# Patient Record
Sex: FEMALE | Race: WHITE | NOT HISPANIC OR LATINO | Employment: OTHER | URBAN - METROPOLITAN AREA
[De-identification: names, ages, dates, MRNs, and addresses within clinical notes are randomized per-mention and may not be internally consistent; named-entity substitution may affect disease eponyms.]

---

## 2019-02-22 ENCOUNTER — HOSPITAL ENCOUNTER (OUTPATIENT)
Dept: OTHER | Facility: HOSPITAL | Age: 74
Discharge: HOME | End: 2019-02-22

## 2019-02-28 ENCOUNTER — HOSPITAL ENCOUNTER (OUTPATIENT)
Dept: OTHER | Facility: HOSPITAL | Age: 74
Discharge: HOME | End: 2019-02-28

## 2019-12-08 ENCOUNTER — HOSPITAL ENCOUNTER (OUTPATIENT)
Dept: URGENT CARE | Facility: CLINIC | Age: 74
Discharge: HOME | End: 2019-12-08

## 2020-03-10 ENCOUNTER — HOSPITAL ENCOUNTER (OUTPATIENT)
Dept: OTHER | Facility: HOSPITAL | Age: 75
Discharge: HOME | End: 2020-03-10

## 2021-03-16 ENCOUNTER — HOSPITAL ENCOUNTER (OUTPATIENT)
Dept: OTHER | Facility: HOSPITAL | Age: 76
Discharge: HOME | End: 2021-03-16

## 2022-04-13 ENCOUNTER — HOSPITAL ENCOUNTER (OUTPATIENT)
Dept: OTHER | Facility: HOSPITAL | Age: 77
Discharge: HOME | End: 2022-04-13

## 2022-10-21 ENCOUNTER — LAB (OUTPATIENT)
Dept: LAB | Facility: HOSPITAL | Age: 77
End: 2022-10-21

## 2022-10-21 ENCOUNTER — TRANSCRIBE ORDERS (OUTPATIENT)
Dept: LAB | Facility: HOSPITAL | Age: 77
End: 2022-10-21

## 2022-10-21 DIAGNOSIS — E16.1 HYPERINSULINISM: ICD-10-CM

## 2022-10-21 DIAGNOSIS — E55.9 VITAMIN D DEFICIENCY: ICD-10-CM

## 2022-10-21 DIAGNOSIS — E53.8 VITAMIN B12 DEFICIENCY: ICD-10-CM

## 2022-10-21 DIAGNOSIS — R73.03 PREDIABETES: ICD-10-CM

## 2022-10-21 DIAGNOSIS — Z98.84 STATUS POST BARIATRIC SURGERY: ICD-10-CM

## 2022-10-21 DIAGNOSIS — E66.9 OBESITY, UNSPECIFIED CLASSIFICATION, UNSPECIFIED OBESITY TYPE, UNSPECIFIED WHETHER SERIOUS COMORBIDITY PRESENT: ICD-10-CM

## 2022-10-21 DIAGNOSIS — Z91.89 AT RISK FOR DIABETES MELLITUS: ICD-10-CM

## 2022-10-21 DIAGNOSIS — E78.5 HYPERLIPIDEMIA, UNSPECIFIED HYPERLIPIDEMIA TYPE: ICD-10-CM

## 2022-10-21 DIAGNOSIS — Z91.89 AT RISK FOR DIABETES MELLITUS: Primary | ICD-10-CM

## 2022-10-21 LAB
25(OH)D3 SERPL-MCNC: 59.7 NG/ML (ref 30–100)
ALBUMIN SERPL-MCNC: 3.8 G/DL (ref 3.5–5.2)
ALBUMIN/GLOB SERPL: 1.3 G/DL
ALP SERPL-CCNC: 57 U/L (ref 39–117)
ALT SERPL W P-5'-P-CCNC: 15 U/L (ref 1–33)
ANION GAP SERPL CALCULATED.3IONS-SCNC: 8.6 MMOL/L (ref 5–15)
AST SERPL-CCNC: 17 U/L (ref 1–32)
BASOPHILS # BLD AUTO: 0.05 10*3/MM3 (ref 0–0.2)
BASOPHILS NFR BLD AUTO: 0.7 % (ref 0–1.5)
BILIRUB SERPL-MCNC: 0.4 MG/DL (ref 0–1.2)
BUN SERPL-MCNC: 22 MG/DL (ref 8–23)
BUN/CREAT SERPL: 30.1 (ref 7–25)
CALCIUM SPEC-SCNC: 9.7 MG/DL (ref 8.6–10.5)
CHLORIDE SERPL-SCNC: 105 MMOL/L (ref 98–107)
CHOLEST SERPL-MCNC: 165 MG/DL (ref 0–200)
CO2 SERPL-SCNC: 27.4 MMOL/L (ref 22–29)
CREAT SERPL-MCNC: 0.73 MG/DL (ref 0.57–1)
DEPRECATED RDW RBC AUTO: 41.2 FL (ref 37–54)
EGFRCR SERPLBLD CKD-EPI 2021: 85.9 ML/MIN/1.73
EOSINOPHIL # BLD AUTO: 0.21 10*3/MM3 (ref 0–0.4)
EOSINOPHIL NFR BLD AUTO: 2.9 % (ref 0.3–6.2)
ERYTHROCYTE [DISTWIDTH] IN BLOOD BY AUTOMATED COUNT: 13 % (ref 12.3–15.4)
FERRITIN SERPL-MCNC: 118 NG/ML (ref 13–150)
FOLATE SERPL-MCNC: >20 NG/ML (ref 4.78–24.2)
GLOBULIN UR ELPH-MCNC: 3 GM/DL
GLUCOSE SERPL-MCNC: 96 MG/DL (ref 65–99)
HBA1C MFR BLD: 5.8 % (ref 4.8–5.6)
HCT VFR BLD AUTO: 42 % (ref 34–46.6)
HDLC SERPL-MCNC: 47 MG/DL (ref 40–60)
HGB BLD-MCNC: 14.1 G/DL (ref 12–15.9)
IMM GRANULOCYTES # BLD AUTO: 0.02 10*3/MM3 (ref 0–0.05)
IMM GRANULOCYTES NFR BLD AUTO: 0.3 % (ref 0–0.5)
IRON 24H UR-MRATE: 104 MCG/DL (ref 37–145)
IRON SATN MFR SERPL: 27 % (ref 20–50)
LDLC SERPL CALC-MCNC: 95 MG/DL (ref 0–100)
LDLC/HDLC SERPL: 1.96 {RATIO}
LYMPHOCYTES # BLD AUTO: 2.73 10*3/MM3 (ref 0.7–3.1)
LYMPHOCYTES NFR BLD AUTO: 37.6 % (ref 19.6–45.3)
MAGNESIUM SERPL-MCNC: 2.1 MG/DL (ref 1.6–2.4)
MCH RBC QN AUTO: 29.3 PG (ref 26.6–33)
MCHC RBC AUTO-ENTMCNC: 33.6 G/DL (ref 31.5–35.7)
MCV RBC AUTO: 87.3 FL (ref 79–97)
MONOCYTES # BLD AUTO: 0.42 10*3/MM3 (ref 0.1–0.9)
MONOCYTES NFR BLD AUTO: 5.8 % (ref 5–12)
NEUTROPHILS NFR BLD AUTO: 3.84 10*3/MM3 (ref 1.7–7)
NEUTROPHILS NFR BLD AUTO: 52.7 % (ref 42.7–76)
NRBC BLD AUTO-RTO: 0 /100 WBC (ref 0–0.2)
PLATELET # BLD AUTO: 244 10*3/MM3 (ref 140–450)
PMV BLD AUTO: 10.4 FL (ref 6–12)
POTASSIUM SERPL-SCNC: 4.1 MMOL/L (ref 3.5–5.2)
PREALB SERPL-MCNC: 25.5 MG/DL (ref 20–40)
PROT SERPL-MCNC: 6.8 G/DL (ref 6–8.5)
RBC # BLD AUTO: 4.81 10*6/MM3 (ref 3.77–5.28)
SODIUM SERPL-SCNC: 141 MMOL/L (ref 136–145)
TIBC SERPL-MCNC: 384 MCG/DL (ref 298–536)
TRANSFERRIN SERPL-MCNC: 258 MG/DL (ref 200–360)
TRIGL SERPL-MCNC: 130 MG/DL (ref 0–150)
VIT B12 BLD-MCNC: 696 PG/ML (ref 211–946)
VLDLC SERPL-MCNC: 23 MG/DL (ref 5–40)
WBC NRBC COR # BLD: 7.27 10*3/MM3 (ref 3.4–10.8)

## 2022-10-21 PROCEDURE — 82728 ASSAY OF FERRITIN: CPT

## 2022-10-21 PROCEDURE — 82306 VITAMIN D 25 HYDROXY: CPT

## 2022-10-21 PROCEDURE — 83540 ASSAY OF IRON: CPT

## 2022-10-21 PROCEDURE — 85025 COMPLETE CBC W/AUTO DIFF WBC: CPT

## 2022-10-21 PROCEDURE — 80053 COMPREHEN METABOLIC PANEL: CPT

## 2022-10-21 PROCEDURE — 82607 VITAMIN B-12: CPT

## 2022-10-21 PROCEDURE — 83735 ASSAY OF MAGNESIUM: CPT

## 2022-10-21 PROCEDURE — 84425 ASSAY OF VITAMIN B-1: CPT

## 2022-10-21 PROCEDURE — 83036 HEMOGLOBIN GLYCOSYLATED A1C: CPT

## 2022-10-21 PROCEDURE — 80061 LIPID PANEL: CPT

## 2022-10-21 PROCEDURE — 84134 ASSAY OF PREALBUMIN: CPT

## 2022-10-21 PROCEDURE — 82746 ASSAY OF FOLIC ACID SERUM: CPT

## 2022-10-21 PROCEDURE — 36415 COLL VENOUS BLD VENIPUNCTURE: CPT

## 2022-10-21 PROCEDURE — 84466 ASSAY OF TRANSFERRIN: CPT

## 2022-10-26 LAB — VIT B1 BLD-SCNC: 194.9 NMOL/L (ref 66.5–200)

## 2023-04-14 ENCOUNTER — HOSPITAL ENCOUNTER (OUTPATIENT)
Dept: OTHER | Facility: HOSPITAL | Age: 78
Discharge: HOME | End: 2023-04-14

## 2023-07-11 ENCOUNTER — TELEPHONE (OUTPATIENT)
Dept: FAMILY MEDICINE CLINIC | Facility: CLINIC | Age: 78
End: 2023-07-11

## 2023-07-11 NOTE — TELEPHONE ENCOUNTER
Caller: Gauri Pan    Relationship: Self    Best call back number: [phone]    What is the best time to reach you: ANY     Who are you requesting to speak with (clinical staff, provider,  specific staff member): CLINICAL     Do you know the name of the person who called: GAURI     What was the call regarding: PATIENT IS RETURNING PHONE CALL TO OFFICE REGARDING REFERRAL. PATIENT IS NOT SURE WHAT REFERRAL OFFICE WAS CALLING ABOUT AND WOULD LIKE A CALL BACK.     PLEASE ADVISE     Is it okay if the provider responds through MyChart: NO

## 2023-08-21 ENCOUNTER — HOSPITAL ENCOUNTER (OUTPATIENT)
Dept: BONE DENSITY | Facility: HOSPITAL | Age: 78
Discharge: HOME | End: 2023-08-21
Payer: MEDICARE

## 2023-08-21 DIAGNOSIS — Z78.0 POSTMENOPAUSAL: ICD-10-CM

## 2023-08-21 PROCEDURE — 77080 DXA BONE DENSITY AXIAL: CPT

## 2023-08-28 ENCOUNTER — TELEPHONE (OUTPATIENT)
Dept: FAMILY MEDICINE CLINIC | Facility: CLINIC | Age: 78
End: 2023-08-28

## 2023-08-28 NOTE — TELEPHONE ENCOUNTER
We can accept the patient, however she does need to go ahead and schedule an appointment to establish her office policy.  Patient is due for her annual wellness visit, she could schedule that if she would like

## 2023-08-28 NOTE — TELEPHONE ENCOUNTER
Provider: JUANITO DOHERTY     Caller: GAURI PEREIRA     Relationship to Patient: SELF     Phone Number: [phone]    Reason for Call: WANTS TO MAKE SURE SHE CAN CHANGE PROVIDER TO ASHLEY BEEBE WHEN CHAS SOLOMON LEAVES, DOES NOT WANT AN APPOINTMENT JUST WANTS TO BE ABLE TO CALL AND HAVE HER ASSIGNED

## 2023-08-28 NOTE — TELEPHONE ENCOUNTER
LMTCB to schedule new pt appt w/Chanel to establish care.    *Ok for HUB to read and schedule next available*

## 2023-09-27 ENCOUNTER — OFFICE VISIT (OUTPATIENT)
Dept: FAMILY MEDICINE CLINIC | Facility: CLINIC | Age: 78
End: 2023-09-27
Payer: MEDICARE

## 2023-09-27 VITALS
HEART RATE: 77 BPM | SYSTOLIC BLOOD PRESSURE: 139 MMHG | BODY MASS INDEX: 39.44 KG/M2 | DIASTOLIC BLOOD PRESSURE: 72 MMHG | HEIGHT: 63 IN | OXYGEN SATURATION: 96 % | WEIGHT: 222.6 LBS

## 2023-09-27 DIAGNOSIS — H26.9 CATARACT, UNSPECIFIED CATARACT TYPE, UNSPECIFIED LATERALITY: ICD-10-CM

## 2023-09-27 DIAGNOSIS — R03.0 ELEVATED BLOOD PRESSURE READING: ICD-10-CM

## 2023-09-27 DIAGNOSIS — Z13.29 THYROID DISORDER SCREEN: ICD-10-CM

## 2023-09-27 DIAGNOSIS — G47.30 SLEEP APNEA, UNSPECIFIED TYPE: ICD-10-CM

## 2023-09-27 DIAGNOSIS — R73.03 PREDIABETES: ICD-10-CM

## 2023-09-27 DIAGNOSIS — E78.5 HYPERLIPIDEMIA, UNSPECIFIED HYPERLIPIDEMIA TYPE: ICD-10-CM

## 2023-09-27 DIAGNOSIS — Z00.00 MEDICARE ANNUAL WELLNESS VISIT, SUBSEQUENT: Primary | ICD-10-CM

## 2023-09-27 DIAGNOSIS — Z23 NEED FOR INFLUENZA VACCINATION: ICD-10-CM

## 2023-09-27 PROCEDURE — 1160F RVW MEDS BY RX/DR IN RCRD: CPT | Performed by: NURSE PRACTITIONER

## 2023-09-27 PROCEDURE — 1170F FXNL STATUS ASSESSED: CPT | Performed by: NURSE PRACTITIONER

## 2023-09-27 PROCEDURE — 1159F MED LIST DOCD IN RCRD: CPT | Performed by: NURSE PRACTITIONER

## 2023-09-27 PROCEDURE — G0008 ADMIN INFLUENZA VIRUS VAC: HCPCS | Performed by: NURSE PRACTITIONER

## 2023-09-27 PROCEDURE — 90662 IIV NO PRSV INCREASED AG IM: CPT | Performed by: NURSE PRACTITIONER

## 2023-09-27 PROCEDURE — G0439 PPPS, SUBSEQ VISIT: HCPCS | Performed by: NURSE PRACTITIONER

## 2023-09-27 NOTE — PROGRESS NOTES
The ABCs of the Annual Wellness Visit  Subsequent Medicare Wellness Visit    Subjective    Leida Pan is a 78 y.o. female who presents for a Subsequent Medicare Wellness Visit.    The following portions of the patient's history were reviewed and   updated as appropriate: allergies, current medications, past family history, past medical history, past social history, past surgical history, and problem list.    Compared to one year ago, the patient feels her physical   health is the same.    Compared to one year ago, the patient feels her mental   health is the same.    Recent Hospitalizations:  She was not admitted to the hospital during the last year.       Current Medical Providers:  Patient Care Team:  Chanel Bunch APRN as PCP - General (Nurse Practitioner)    Outpatient Medications Prior to Visit   Medication Sig Dispense Refill    atorvastatin (LIPITOR) 10 MG tablet Take 1 tablet by mouth Daily.      calcium acetate (PHOS BINDER,) 667 MG capsule capsule Take 2 capsules by mouth 3 (Three) Times a Day.      Cobalamin Combinations (B-12) 100-5000 MCG sublingual tablet Place  under the tongue. 1 A WEEK      iron polysacch complex-B12-FA (FERREX 150 FORTE) 150-0.025-1 MG capsule capsule Take  by mouth Daily.      multivitamin with minerals tablet tablet Take 1 tablet by mouth Daily.      triamcinolone (KENALOG) 0.1 % ointment Apply  topically to the appropriate area as directed.      metFORMIN (GLUCOPHAGE) 500 MG tablet 1 tablet 2 (Two) Times a Day With Meals.       No facility-administered medications prior to visit.       No opioid medication identified on active medication list. I have reviewed chart for other potential  high risk medication/s and harmful drug interactions in the elderly.        Aspirin is not on active medication list.  Aspirin use is not indicated based on review of current medical condition/s. Risk of harm outweighs potential benefits.  .    Patient Active Problem List   Diagnosis     "Elevated blood pressure reading    Prediabetes    Hyperlipidemia    Sleep apnea     Advance Care Planning   Advance Care Planning     Advance Directive is not on file.  ACP discussion was held with the patient during this visit. Patient has an advance directive (not in EMR), copy requested.     Objective    Vitals:    23 1105   BP: 139/72   Pulse: 77   SpO2: 96%   Weight: 101 kg (222 lb 9.6 oz)   Height: 160 cm (63\")   PainSc:   2   PainLoc: Hip  Comment: left     Estimated body mass index is 39.43 kg/m² as calculated from the following:    Height as of this encounter: 160 cm (63\").    Weight as of this encounter: 101 kg (222 lb 9.6 oz).           Does the patient have evidence of cognitive impairment? No          HEALTH RISK ASSESSMENT    Smoking Status:  Social History     Tobacco Use   Smoking Status Not on file   Smokeless Tobacco Not on file     Alcohol Consumption:  Social History     Substance and Sexual Activity   Alcohol Use Not Currently     Fall Risk Screen:    STEADI Fall Risk Assessment was completed, and patient is at LOW risk for falls.Assessment completed on:2023    Depression Screenin/27/2023    11:03 AM   PHQ-2/PHQ-9 Depression Screening   Little Interest or Pleasure in Doing Things 0-->not at all   Feeling Down, Depressed or Hopeless 0-->not at all   PHQ-9: Brief Depression Severity Measure Score 0       Health Habits and Functional and Cognitive Screenin/27/2023    11:00 AM   Functional & Cognitive Status   Do you have difficulty preparing food and eating? No   Do you have difficulty bathing yourself, getting dressed or grooming yourself? No   Do you have difficulty using the toilet? No   Do you have difficulty moving around from place to place? No   Do you have trouble with steps or getting out of a bed or a chair? No   Current Diet Low Fat Diet   Dental Exam Up to date   Eye Exam Not up to date   Exercise (times per week) 7 times per week   Current Exercises Include " Walking   Do you need help using the phone?  No   Are you deaf or do you have serious difficulty hearing?  No   Do you need help to go to places out of walking distance? No   Do you need help shopping? No   Do you need help preparing meals?  No   Do you need help with housework?  No   Do you need help with laundry? No   Do you need help taking your medications? No   Do you need help managing money? No   Do you ever drive or ride in a car without wearing a seat belt? No   Have you felt unusual stress, anger or loneliness in the last month? No   Who do you live with? Alone   If you need help, do you have trouble finding someone available to you? No   Have you been bothered in the last four weeks by sexual problems? No   Do you have difficulty concentrating, remembering or making decisions? No       Age-appropriate Screening Schedule:  Refer to the list below for future screening recommendations based on patient's age, sex and/or medical conditions. Orders for these recommended tests are listed in the plan section. The patient has been provided with a written plan.    Health Maintenance   Topic Date Due    TDAP/TD VACCINES (1 - Tdap) Never done    Pneumococcal Vaccine 65+ (1 - PCV) Never done    ZOSTER VACCINE (2 of 2) 11/09/2020    HEPATITIS C SCREENING  Never done    ANNUAL WELLNESS VISIT  Never done    COVID-19 Vaccine (6 - 2023-24 season) 09/01/2023    INFLUENZA VACCINE  10/01/2023    LIPID PANEL  10/21/2023    BMI FOLLOWUP  07/07/2024    DXA SCAN  08/21/2025    COLORECTAL CANCER SCREENING  Discontinued                  CMS Preventative Services Quick Reference  Risk Factors Identified During Encounter  None Identified  The above risks/problems have been discussed with the patient.  Pertinent information has been shared with the patient in the After Visit Summary.  An After Visit Summary and PPPS were made available to the patient.    Follow Up:   Next Medicare Wellness visit to be scheduled in 1 year.  "      Additional E&M Note during same encounter follows:  Patient has multiple medical problems which are significant and separately identifiable that require additional work above and beyond the Medicare Wellness Visit.      Chief Complaint  Establish Care    Subjective        HPI  Leida Pan is also being seen today for     History of Present Illness  Pt here to [facility], previous pcp: judah lopez.    Pt states she had shingles and  pneumonia vaccines while living in [place]     Pt needs refill on metformin.    Pt had a stomach sleeve in 2018 and wants PCP to look at blood work from her doctor in her [place].     Pt would like new eye doctor for her cataract.     Pt wants to discuss new doctor for sleep apnea and her cpap machine.          Objective   Vital Signs:  /72   Pulse 77   Ht 160 cm (63\")   Wt 101 kg (222 lb 9.6 oz)   SpO2 96%   BMI 39.43 kg/m²     Physical Exam  Vitals reviewed.   Constitutional:       Appearance: Normal appearance. She is well-developed.   HENT:      Head: Normocephalic and atraumatic.      Right Ear: External ear normal.      Left Ear: External ear normal.   Eyes:      Conjunctiva/sclera: Conjunctivae normal.      Pupils: Pupils are equal, round, and reactive to light.   Cardiovascular:      Rate and Rhythm: Normal rate and regular rhythm.      Heart sounds: No murmur heard.    No friction rub. No gallop.   Pulmonary:      Effort: Pulmonary effort is normal.      Breath sounds: Normal breath sounds. No wheezing or rhonchi.   Skin:     General: Skin is warm and dry.   Neurological:      Mental Status: She is alert and oriented to person, place, and time.      Cranial Nerves: No cranial nerve deficit.   Psychiatric:         Mood and Affect: Mood and affect normal.         Behavior: Behavior normal.         Thought Content: Thought content normal.         Judgment: Judgment normal.        The following data was reviewed by: TOI Lemus on 09/27/2023:  CMP  "         10/21/2022    09:12   CMP   Glucose 96    BUN 22    Creatinine 0.73    EGFR 85.9    Sodium 141    Potassium 4.1    Chloride 105    Calcium 9.7    Total Protein 6.8    Albumin 3.80    Globulin 3.0    Total Bilirubin 0.4    Alkaline Phosphatase 57    AST (SGOT) 17    ALT (SGPT) 15    Albumin/Globulin Ratio 1.3    BUN/Creatinine Ratio 30.1    Anion Gap 8.6      CBC          10/21/2022    09:12   CBC   WBC 7.27    RBC 4.81    Hemoglobin 14.1    Hematocrit 42.0    MCV 87.3    MCH 29.3    MCHC 33.6    RDW 13.0    Platelets 244      Lipid Panel          10/21/2022    09:12   Lipid Panel   Total Cholesterol 165    Triglycerides 130    HDL Cholesterol 47    VLDL Cholesterol 23    LDL Cholesterol  95    LDL/HDL Ratio 1.96        Most Recent A1C          10/21/2022    09:12   HGBA1C Most Recent   Hemoglobin A1C 5.80                 Assessment and Plan   Diagnoses and all orders for this visit:    1. Medicare annual wellness visit, subsequent (Primary)    2. Prediabetes  Assessment & Plan:  Stable and well-controlled with metformin, continue current dose    Orders:  -     metFORMIN (GLUCOPHAGE) 500 MG tablet; Take 1 tablet by mouth 2 (Two) Times a Day With Meals.  Dispense: 180 tablet; Refill: 1  -     Comprehensive Metabolic Panel; Future  -     CBC & Differential; Future  -     Hemoglobin A1c; Future    3. Elevated blood pressure reading  Assessment & Plan:  Blood pressure noted to be elevated today, patient will monitor at home, follow-up if remaining elevated, discussed goal 130/80 or less.      4. Thyroid disorder screen  -     TSH Rfx On Abnormal To Free T4; Future    5. Hyperlipidemia, unspecified hyperlipidemia type  -     Comprehensive Metabolic Panel; Future  -     CBC & Differential; Future  -     Lipid Panel; Future    6. Cataract, unspecified cataract type, unspecified laterality  -     Ambulatory Referral to Ophthalmology    7. Sleep apnea, unspecified type  -     Ambulatory Referral to Sleep  Medicine    8. Need for influenza vaccination  -     Fluzone High-Dose 65+yrs (5928-4538)             Follow Up   Return in about 3 months (around 12/27/2023), or if symptoms worsen or fail to improve.  Patient was given instructions and counseling regarding her condition or for health maintenance advice. Please see specific information pulled into the AVS if appropriate.

## 2023-09-27 NOTE — PROGRESS NOTES
"Chief Complaint  Establish Care    SUBJECTIVE  Leida Pan presents to [facility] ***    History of Present Illness  Pt here to [facility], previous pcp: judah lpoez.    Pt needs refill on metformin.    Pt had a stomach sleeve in 2018 and wants PCP to look at blood work from her doctor in her [place].     Pt would like new eye doctor for her cataract.     Pt wants to discuss new doctor for sleep apnea and her cpap machine.     Pt states she has L hip pain when she first gets up in the AM.     Pt is due for vaccines and is aware. Pt would like to talk about the RSV and flu vaccines.   Past Medical History:   Diagnosis Date    Arthritis     Cataract     History of medical problems       Family History   Problem Relation Age of Onset    Cancer Mother     Heart disease Mother     Heart disease Father       Past Surgical History:   Procedure Laterality Date    BARIATRIC SURGERY  2019        Current Outpatient Medications:     atorvastatin (LIPITOR) 10 MG tablet, Take 1 tablet by mouth Daily., Disp: , Rfl:     calcium acetate (PHOS BINDER,) 667 MG capsule capsule, Take 2 capsules by mouth 3 (Three) Times a Day., Disp: , Rfl:     Cobalamin Combinations (B-12) 100-5000 MCG sublingual tablet, Place  under the tongue. 1 A WEEK, Disp: , Rfl:     iron polysacch complex-B12-FA (FERREX 150 FORTE) 150-0.025-1 MG capsule capsule, Take  by mouth Daily., Disp: , Rfl:     metFORMIN (GLUCOPHAGE) 500 MG tablet, 1 tablet 2 (Two) Times a Day With Meals., Disp: , Rfl:     multivitamin with minerals tablet tablet, Take 1 tablet by mouth Daily., Disp: , Rfl:     triamcinolone (KENALOG) 0.1 % ointment, Apply  topically to the appropriate area as directed., Disp: , Rfl:     OBJECTIVE  Vital Signs:   /72   Pulse 77   Ht 160 cm (63\")   Wt 101 kg (222 lb 9.6 oz)   SpO2 96%   BMI 39.43 kg/m²    Estimated body mass index is 39.43 kg/m² as calculated from the following:    Height as of this encounter: " "160 cm (63\").    Weight as of this encounter: 101 kg (222 lb 9.6 oz).     Wt Readings from Last 3 Encounters:   09/27/23 101 kg (222 lb 9.6 oz)   07/07/23 102 kg (225 lb)     BP Readings from Last 3 Encounters:   09/27/23 139/72   07/07/23 140/83       Physical Exam     Result Review    {[facility] Ambulatory Labs (Optional):41196}    No Images in the past 120 days found..     The above data has been reviewed by TOI Lemus 09/27/2023 10:51 EDT.          Patient Care Team:  Chanel Bunch APRN as PCP - General (Nurse Practitioner)            ASSESSMENT & PLAN    Diagnoses and all orders for this visit:    1. Prediabetes (Primary)         Tobacco Use: Not on file       Follow Up     No follow-ups on file.    {Time Spent (Optional):62872}    Patient was given instructions and counseling regarding her condition or for health maintenance advice. Please see specific information pulled into the AVS if appropriate.   I have reviewed information obtained and documented by others and I have confirmed the accuracy of this documented note.    TOI Lemus          "

## 2023-09-28 PROBLEM — E78.5 HYPERLIPIDEMIA: Status: ACTIVE | Noted: 2023-09-28

## 2023-09-28 PROBLEM — G47.30 SLEEP APNEA: Status: ACTIVE | Noted: 2023-09-28

## 2023-09-28 NOTE — ASSESSMENT & PLAN NOTE
Blood pressure noted to be elevated today, patient will monitor at home, follow-up if remaining elevated, discussed goal 130/80 or less.

## 2023-10-23 ENCOUNTER — OFFICE VISIT (OUTPATIENT)
Dept: SLEEP MEDICINE | Facility: HOSPITAL | Age: 78
End: 2023-10-23
Payer: MEDICARE

## 2023-10-23 VITALS
HEART RATE: 80 BPM | OXYGEN SATURATION: 94 % | HEIGHT: 63 IN | BODY MASS INDEX: 39.46 KG/M2 | DIASTOLIC BLOOD PRESSURE: 75 MMHG | WEIGHT: 222.7 LBS | SYSTOLIC BLOOD PRESSURE: 133 MMHG

## 2023-10-23 DIAGNOSIS — G47.33 OBSTRUCTIVE SLEEP APNEA, ADULT: Primary | ICD-10-CM

## 2023-10-23 DIAGNOSIS — E66.09 CLASS 2 OBESITY DUE TO EXCESS CALORIES WITH BODY MASS INDEX (BMI) OF 39.0 TO 39.9 IN ADULT, UNSPECIFIED WHETHER SERIOUS COMORBIDITY PRESENT: ICD-10-CM

## 2023-10-23 PROCEDURE — 1160F RVW MEDS BY RX/DR IN RCRD: CPT | Performed by: NURSE PRACTITIONER

## 2023-10-23 PROCEDURE — 99203 OFFICE O/P NEW LOW 30 MIN: CPT | Performed by: NURSE PRACTITIONER

## 2023-10-23 PROCEDURE — G0463 HOSPITAL OUTPT CLINIC VISIT: HCPCS

## 2023-10-23 PROCEDURE — 1159F MED LIST DOCD IN RCRD: CPT | Performed by: NURSE PRACTITIONER

## 2023-10-23 NOTE — PROGRESS NOTES
"  [address]  [facility], [place] [address]  Phone: [phone]  Fax: [phone]      Leida Pan  [id number]   1945  78 y.o.  female      PCP:Chanel Bunch APRN    Type of service: Initial New Patient Office Visit  Date of service: 10/23/2023          CHIEF COMPLAINT: Obstructive sleep apnea      HISTORY OF PRESENT ILLNESS:  Leida Pan 78 y.o.  presents to the clinic today as a new patient to me and was seen to establish care for treatment and management of obstructive sleep apnea.  She previously had a sleep study in [facility].  I do not have records of this to review, have requested a copy.  She presents today to establish care.  We were able to get a download from her ResMed AirSense 10 auto CPAP to review.  Her minimum pressure is at 6, maximum pressure 15, EPR is off.  She has 100% usage for the last 30 days, always more than 4 hours.  Her median pressure is 11.6, 95th percentile pressure 13.7, and maximum pressure 14.5.  Her AHI is 0.5/h.  Reports she got this current CPAP about 3 years ago after the Yonathan recall.   She is doing great with current CPAP, loves CPAP, reports it is her \"best friend\", changed her life.  She moved to [facility] with her son and daughter-in-law to be closer to them and her grandkids.  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  She has a grandson in [employer] school.        PATIENT HISTORY:  Sleep schedule:  Bedtime: 1030 to 11 PM  Wake time: 7 to 8 AM  Normally takes 10 minutes to fall asleep  Number of naps per day: 0      Past medical history: (Relevant to sleep medicine)  Prediabetes  Hyperlipidemia      Social history:  Do you drive a commercial vehicle:  No   Shift work:  No   Tobacco use:  No  Alcohol use:  0 per week  Caffeinated drinks: 0 per day      Family history (parents, siblings, children) (relevant to sleep " "medicine):  No relevant    Medications: reviewed     ALLERGIES: Erythromycin base        REVIEW OF SYSTEMS:  Full review of systems available on the intake form which is scanned in the media tab.  The relevant positives are noted below:  [place] Sleepiness Scale: Total score: 1   Occasional dry mouth          PHYSICAL EXAM:  Vitals:    10/23/23 1100   BP: 133/75   Pulse: 80   SpO2: 94%   Weight: 101 kg (222 lb 11.2 oz)   Height: 160 cm (63\")    Body mass index is 39.45 kg/m². Neck Circumference: 16 inches  HEAD: atraumatic, normocephalic  NECK: Neck Circumference: 16 inches, trachea is in the midline  RESPIRATORY SYSTEM: Respirations even, unlabored, normal rate  CARDIOVASULAR SYSTEM: Normal rate  EXTREMITES: No cyanosis or clubbing  NEUROLOGICAL SYSTEM: Alert and oriented x 3, no gross motor defects, gait normal    Office notes from care team reviewed. Office note(s) dated 9/27/2023, reviewed.      Labs/ Test Results Reviewed:   Reviewed 4/2023 BMP, magnesium level           DATA REVIEWED:   The Smart card downloaded on 10/23/2023 has been reviewed independently by me for compliance and discussed the data with the patient.   Compliance: 100%  More than 4 hr use: 100%  Average use of the device: 9 hours 16 minutes per night  Residual AHI: 0.5 /hr (goal < 5.0 /hr)  Mask type: Over nose  Device: ResMed air sense 10  DME: Was using [facility] care in [facility], will change to [facility]            ASSESSMENT AND PLAN:   Obstructive Sleep Apnea (G 47.33): sleep apnea symptoms have improved with the device and treatment.  Patient has excellent compliance with the device for treatment of sleep apnea.  I have personally reviewed the smart card download and discussed the download data with the patient and encouarged continued use of the device.  The residual AHI is acceptable. The device is benefiting the patient and the device is medically necessary.  Without adequate treatment of sleep apnea and without good " compliance, patient would be at increased risk of hypertension, diabetes mellitus, pulmonary hypertension, atrial fibrillation, stroke, and nonrestorative sleep with hypersomnia which could increase risk of motor vehicle accidents. The patient is also instructed to get the supplies from the DME company and and change them on a regular basis.  A prescription for supplies has been sent to the DME company.  I have also discussed with this patient the importance of good sleep hygiene and getting an adequate amount of sleep to aid in overall good health. Will change DME to [facility].  Requested copy of previous sleep study, will addend note once received.  ADDENDUM: Received previous sleep study from 8/27/2012 showing average AHI of 7.1/h.    Obesity: Body mass index is 39.45 kg/m².. Patients who are overweight or obese are at increased risk of sleep apnea/ sleep disordered breathing. Weight reduction and healthy lifestyle are encouraged in overweight/ obese patients as part of a comprehensive approach to sleep apnea treatment.    Prediabetes  Hyperlipidemia  History of gastric bypass    Patient will follow-up in 6 months or sooner for issues or concerns.  Patient's questions were answered.          Thank you for allowing me to participate in the care of this patient.    Charline Ashton DNP, APRN  [facility]

## 2023-10-25 ENCOUNTER — LAB (OUTPATIENT)
Dept: LAB | Facility: HOSPITAL | Age: 78
End: 2023-10-25
Payer: MEDICARE

## 2023-10-25 DIAGNOSIS — R73.03 PREDIABETES: ICD-10-CM

## 2023-10-25 DIAGNOSIS — R73.09 ELEVATED GLUCOSE: ICD-10-CM

## 2023-10-25 DIAGNOSIS — I10 PRIMARY HYPERTENSION: ICD-10-CM

## 2023-10-25 DIAGNOSIS — Z13.6 ENCOUNTER FOR LIPID SCREENING FOR CARDIOVASCULAR DISEASE: ICD-10-CM

## 2023-10-25 DIAGNOSIS — Z13.29 SCREENING FOR THYROID DISORDER: ICD-10-CM

## 2023-10-25 DIAGNOSIS — E78.5 HYPERLIPIDEMIA, UNSPECIFIED HYPERLIPIDEMIA TYPE: ICD-10-CM

## 2023-10-25 DIAGNOSIS — Z98.84 H/O GASTRIC BYPASS: ICD-10-CM

## 2023-10-25 DIAGNOSIS — Z13.220 ENCOUNTER FOR LIPID SCREENING FOR CARDIOVASCULAR DISEASE: ICD-10-CM

## 2023-10-25 DIAGNOSIS — Z13.29 THYROID DISORDER SCREEN: ICD-10-CM

## 2023-10-25 LAB
ALBUMIN SERPL-MCNC: 4 G/DL (ref 3.5–5.2)
ALBUMIN/GLOB SERPL: 1.3 G/DL
ALP SERPL-CCNC: 61 U/L (ref 39–117)
ALT SERPL W P-5'-P-CCNC: 17 U/L (ref 1–33)
ANION GAP SERPL CALCULATED.3IONS-SCNC: 9 MMOL/L (ref 5–15)
AST SERPL-CCNC: 21 U/L (ref 1–32)
BASOPHILS # BLD AUTO: 0.06 10*3/MM3 (ref 0–0.2)
BASOPHILS NFR BLD AUTO: 0.8 % (ref 0–1.5)
BILIRUB SERPL-MCNC: 0.4 MG/DL (ref 0–1.2)
BUN SERPL-MCNC: 23 MG/DL (ref 8–23)
BUN/CREAT SERPL: 29.5 (ref 7–25)
CALCIUM SPEC-SCNC: 9.6 MG/DL (ref 8.6–10.5)
CHLORIDE SERPL-SCNC: 108 MMOL/L (ref 98–107)
CHOLEST SERPL-MCNC: 152 MG/DL (ref 0–200)
CO2 SERPL-SCNC: 25 MMOL/L (ref 22–29)
CREAT SERPL-MCNC: 0.78 MG/DL (ref 0.57–1)
DEPRECATED RDW RBC AUTO: 41 FL (ref 37–54)
EGFRCR SERPLBLD CKD-EPI 2021: 78.8 ML/MIN/1.73
EOSINOPHIL # BLD AUTO: 0.29 10*3/MM3 (ref 0–0.4)
EOSINOPHIL NFR BLD AUTO: 3.9 % (ref 0.3–6.2)
ERYTHROCYTE [DISTWIDTH] IN BLOOD BY AUTOMATED COUNT: 13 % (ref 12.3–15.4)
FOLATE SERPL-MCNC: >20 NG/ML (ref 4.78–24.2)
GLOBULIN UR ELPH-MCNC: 3.2 GM/DL
GLUCOSE SERPL-MCNC: 94 MG/DL (ref 65–99)
HBA1C MFR BLD: 5.7 % (ref 4.8–5.6)
HCT VFR BLD AUTO: 43.2 % (ref 34–46.6)
HDLC SERPL-MCNC: 45 MG/DL (ref 40–60)
HGB BLD-MCNC: 14.4 G/DL (ref 12–15.9)
IMM GRANULOCYTES # BLD AUTO: 0.01 10*3/MM3 (ref 0–0.05)
IMM GRANULOCYTES NFR BLD AUTO: 0.1 % (ref 0–0.5)
LDLC SERPL CALC-MCNC: 89 MG/DL (ref 0–100)
LDLC/HDLC SERPL: 1.95 {RATIO}
LYMPHOCYTES # BLD AUTO: 2.94 10*3/MM3 (ref 0.7–3.1)
LYMPHOCYTES NFR BLD AUTO: 39.8 % (ref 19.6–45.3)
MCH RBC QN AUTO: 29 PG (ref 26.6–33)
MCHC RBC AUTO-ENTMCNC: 33.3 G/DL (ref 31.5–35.7)
MCV RBC AUTO: 86.9 FL (ref 79–97)
MONOCYTES # BLD AUTO: 0.42 10*3/MM3 (ref 0.1–0.9)
MONOCYTES NFR BLD AUTO: 5.7 % (ref 5–12)
NEUTROPHILS NFR BLD AUTO: 3.66 10*3/MM3 (ref 1.7–7)
NEUTROPHILS NFR BLD AUTO: 49.7 % (ref 42.7–76)
NRBC BLD AUTO-RTO: 0 /100 WBC (ref 0–0.2)
PLATELET # BLD AUTO: 283 10*3/MM3 (ref 140–450)
PMV BLD AUTO: 10.3 FL (ref 6–12)
POTASSIUM SERPL-SCNC: 4.2 MMOL/L (ref 3.5–5.2)
PROT SERPL-MCNC: 7.2 G/DL (ref 6–8.5)
RBC # BLD AUTO: 4.97 10*6/MM3 (ref 3.77–5.28)
SODIUM SERPL-SCNC: 142 MMOL/L (ref 136–145)
TRIGL SERPL-MCNC: 97 MG/DL (ref 0–150)
TSH SERPL DL<=0.05 MIU/L-ACNC: 3.86 UIU/ML (ref 0.27–4.2)
VIT B12 BLD-MCNC: 604 PG/ML (ref 211–946)
VLDLC SERPL-MCNC: 18 MG/DL (ref 5–40)
WBC NRBC COR # BLD: 7.38 10*3/MM3 (ref 3.4–10.8)

## 2023-10-25 PROCEDURE — 82746 ASSAY OF FOLIC ACID SERUM: CPT

## 2023-10-25 PROCEDURE — 83036 HEMOGLOBIN GLYCOSYLATED A1C: CPT

## 2023-10-25 PROCEDURE — 85025 COMPLETE CBC W/AUTO DIFF WBC: CPT

## 2023-10-25 PROCEDURE — 80053 COMPREHEN METABOLIC PANEL: CPT

## 2023-10-25 PROCEDURE — 36415 COLL VENOUS BLD VENIPUNCTURE: CPT

## 2023-10-25 PROCEDURE — 84443 ASSAY THYROID STIM HORMONE: CPT

## 2023-10-25 PROCEDURE — 80061 LIPID PANEL: CPT

## 2023-10-25 PROCEDURE — 82607 VITAMIN B-12: CPT

## 2024-02-26 ENCOUNTER — TELEPHONE (OUTPATIENT)
Dept: FAMILY MEDICINE CLINIC | Facility: CLINIC | Age: 79
End: 2024-02-26
Payer: MEDICARE

## 2024-02-26 NOTE — TELEPHONE ENCOUNTER
Hello you currently have a referral in your chart for OPHTHALMOLOGY that has not been scheduled. Please call [phone] Charleen to schedule your appointments.    If you are no longer wanting this referral please call our office at [phone].    Thank you.

## 2024-03-18 ENCOUNTER — OFFICE VISIT (OUTPATIENT)
Dept: FAMILY MEDICINE CLINIC | Facility: CLINIC | Age: 79
End: 2024-03-18
Payer: MEDICARE

## 2024-03-18 VITALS
OXYGEN SATURATION: 98 % | BODY MASS INDEX: 40.75 KG/M2 | WEIGHT: 230 LBS | DIASTOLIC BLOOD PRESSURE: 72 MMHG | HEART RATE: 78 BPM | SYSTOLIC BLOOD PRESSURE: 151 MMHG | HEIGHT: 63 IN

## 2024-03-18 DIAGNOSIS — E78.5 HYPERLIPIDEMIA, UNSPECIFIED HYPERLIPIDEMIA TYPE: ICD-10-CM

## 2024-03-18 DIAGNOSIS — I10 HYPERTENSION, UNSPECIFIED TYPE: Primary | ICD-10-CM

## 2024-03-18 DIAGNOSIS — Z13.29 THYROID DISORDER SCREEN: ICD-10-CM

## 2024-03-18 DIAGNOSIS — R73.03 PREDIABETES: ICD-10-CM

## 2024-03-18 DIAGNOSIS — E66.01 CLASS 3 SEVERE OBESITY WITH SERIOUS COMORBIDITY AND BODY MASS INDEX (BMI) OF 40.0 TO 44.9 IN ADULT, UNSPECIFIED OBESITY TYPE: ICD-10-CM

## 2024-03-18 DIAGNOSIS — Z12.31 SCREENING MAMMOGRAM FOR BREAST CANCER: ICD-10-CM

## 2024-03-18 PROBLEM — E66.813 CLASS 3 SEVERE OBESITY WITH SERIOUS COMORBIDITY AND BODY MASS INDEX (BMI) OF 40.0 TO 44.9 IN ADULT: Status: ACTIVE | Noted: 2024-03-18

## 2024-03-18 RX ORDER — LISINOPRIL 10 MG/1
10 TABLET ORAL DAILY
Qty: 30 TABLET | Refills: 1 | Status: SHIPPED | OUTPATIENT
Start: 2024-03-18

## 2024-03-18 RX ORDER — ATORVASTATIN CALCIUM 10 MG/1
10 TABLET, FILM COATED ORAL DAILY
Qty: 90 TABLET | Refills: 1 | Status: SHIPPED | OUTPATIENT
Start: 2024-03-18

## 2024-03-18 NOTE — ASSESSMENT & PLAN NOTE
Blood pressure elevated initially, improved upon manual recheck but remains elevated, discussed with patient that I would like for her to monitor her blood pressure for the next month, we will follow-up at that time and if blood pressure remains elevated we will need to make medication adjustment, patient to work on diet and exercise, discussed that she has had some recent weight gain, patient verbalized understanding, we will follow-up in a month

## 2024-03-18 NOTE — ASSESSMENT & PLAN NOTE
Patient's (Body mass index is 40.74 kg/m².) indicates that they are morbidly/severely obese (BMI > 40 or > 35 with obesity - related health condition) with health conditions that include hypertension and diabetes mellitus . Weight is worsening. BMI  is above average; BMI management plan is completed. We discussed portion control and increasing exercise.

## 2024-03-18 NOTE — PROGRESS NOTES
"Chief Complaint  Diabetes and Hyperlipidemia    SUBJECTIVE  Leida Pan presents to [facility] for 6 month follow up on Diabetes and Hyperlipidemia.    Had a biopsy of a place on nose by dermatology last week, awaiting pathology     History of Present Illness  Past Medical History:   Diagnosis Date    Arthritis     Cataract     History of medical problems       Family History   Problem Relation Age of Onset    Cancer Mother     Heart disease Mother     Heart disease Father       Past Surgical History:   Procedure Laterality Date    BARIATRIC SURGERY  2019        Current Outpatient Medications:     atorvastatin (LIPITOR) 10 MG tablet, Take 1 tablet by mouth Daily., Disp: 90 tablet, Rfl: 1    calcium acetate (PHOS BINDER,) 667 MG capsule capsule, Take 2 capsules by mouth 3 (Three) Times a Day., Disp: , Rfl:     Cobalamin Combinations (B-12) 100-5000 MCG sublingual tablet, Place  under the tongue. 1 A WEEK, Disp: , Rfl:     iron polysacch complex-B12-FA (FERREX 150 FORTE) 150-0.025-1 MG capsule capsule, Take  by mouth Daily., Disp: , Rfl:     metFORMIN (GLUCOPHAGE) 500 MG tablet, Take 1 tablet by mouth 2 (Two) Times a Day With Meals., Disp: 180 tablet, Rfl: 1    multivitamin with minerals tablet tablet, Take 1 tablet by mouth Daily., Disp: , Rfl:     triamcinolone (KENALOG) 0.1 % ointment, Apply  topically to the appropriate area as directed., Disp: , Rfl:     lisinopril (PRINIVIL,ZESTRIL) 10 MG tablet, Take 1 tablet by mouth Daily., Disp: 30 tablet, Rfl: 1    OBJECTIVE  Vital Signs:   /72   Pulse 78   Ht 160 cm (63\")   Wt 104 kg (230 lb)   SpO2 98%   BMI 40.74 kg/m²    Estimated body mass index is 40.74 kg/m² as calculated from the following:    Height as of this encounter: 160 cm (63\").    Weight as of this encounter: 104 kg (230 lb).     Wt Readings from Last 3 Encounters:   03/18/24 104 kg (230 lb)   10/23/23 101 kg (222 lb 11.2 oz)   09/27/23 101 kg (222 lb 9.6 oz) "     BP Readings from Last 3 Encounters:   03/18/24 151/72   10/23/23 133/75   09/27/23 139/72       Physical Exam  Vitals reviewed.   Constitutional:       Appearance: Normal appearance. She is well-developed.   HENT:      Head: Normocephalic and atraumatic.      Right Ear: External ear normal.      Left Ear: External ear normal.   Eyes:      Conjunctiva/sclera: Conjunctivae normal.      Pupils: Pupils are equal, round, and reactive to light.   Cardiovascular:      Rate and Rhythm: Normal rate and regular rhythm.      Heart sounds: No murmur heard.     No friction rub. No gallop.   Pulmonary:      Effort: Pulmonary effort is normal.      Breath sounds: Normal breath sounds. No wheezing or rhonchi.   Skin:     General: Skin is warm and dry.   Neurological:      Mental Status: She is alert and oriented to person, place, and time.      Cranial Nerves: No cranial nerve deficit.   Psychiatric:         Mood and Affect: Mood and affect normal.         Behavior: Behavior normal.         Thought Content: Thought content normal.         Judgment: Judgment normal.          Result Review    CMP          10/25/2023    08:42   CMP   Glucose 94    BUN 23    Creatinine 0.78    EGFR 78.8    Sodium 142    Potassium 4.2    Chloride 108    Calcium 9.6    Total Protein 7.2    Albumin 4.0    Globulin 3.2    Total Bilirubin 0.4    Alkaline Phosphatase 61    AST (SGOT) 21    ALT (SGPT) 17    Albumin/Globulin Ratio 1.3    BUN/Creatinine Ratio 29.5    Anion Gap 9.0      CBC          10/25/2023    08:42   CBC   WBC 7.38    RBC 4.97    Hemoglobin 14.4    Hematocrit 43.2    MCV 86.9    MCH 29.0    MCHC 33.3    RDW 13.0    Platelets 283      Lipid Panel          10/25/2023    08:42   Lipid Panel   Total Cholesterol 152    Triglycerides 97    HDL Cholesterol 45    VLDL Cholesterol 18    LDL Cholesterol  89    LDL/HDL Ratio 1.95      TSH          10/25/2023    08:42   TSH   TSH 3.860      Most Recent A1C          10/25/2023    08:42   HGBA1C Most  Recent   Hemoglobin A1C 5.70        No Images in the past 120 days found..     The above data has been reviewed by TOI Lemus 03/18/2024 08:34 EDT.          Patient Care Team:  Chanel Bunch APRN as PCP - General (Nurse Practitioner)            ASSESSMENT & PLAN    Diagnoses and all orders for this visit:    1. Hypertension, unspecified type (Primary)  Assessment & Plan:  Blood pressure elevated initially, improved upon manual recheck but remains elevated, discussed with patient that I would like for her to monitor her blood pressure for the next month, we will follow-up at that time and if blood pressure remains elevated we will need to make medication adjustment, patient to work on diet and exercise, discussed that she has had some recent weight gain, patient verbalized understanding, we will follow-up in a month    Orders:  -     Comprehensive Metabolic Panel; Future  -     CBC & Differential; Future  -     Lipid Panel; Future  -     lisinopril (PRINIVIL,ZESTRIL) 10 MG tablet; Take 1 tablet by mouth Daily.  Dispense: 30 tablet; Refill: 1    2. Screening mammogram for breast cancer  -     Mammo Screening Digital Tomosynthesis Bilateral With CAD; Future    3. Prediabetes  -     Hemoglobin A1c; Future  -     metFORMIN (GLUCOPHAGE) 500 MG tablet; Take 1 tablet by mouth 2 (Two) Times a Day With Meals.  Dispense: 180 tablet; Refill: 1    4. Class 3 severe obesity with serious comorbidity and body mass index (BMI) of 40.0 to 44.9 in adult, unspecified obesity type  Assessment & Plan:  Patient's (Body mass index is 40.74 kg/m².) indicates that they are morbidly/severely obese (BMI > 40 or > 35 with obesity - related health condition) with health conditions that include hypertension and diabetes mellitus . Weight is worsening. BMI  is above average; BMI management plan is completed. We discussed portion control and increasing exercise.       5. Thyroid disorder screen  -     TSH Rfx On Abnormal To Free  T4; Future    6. Hyperlipidemia, unspecified hyperlipidemia type  Overview:  Stable on atorvastatin, continue current medication    Orders:  -     atorvastatin (LIPITOR) 10 MG tablet; Take 1 tablet by mouth Daily.  Dispense: 90 tablet; Refill: 1         Tobacco Use: Low Risk  (3/18/2024)    Patient History     Smoking Tobacco Use: Never     Smokeless Tobacco Use: Never     Passive Exposure: Not on file       Follow Up     Return in about 4 weeks (around 4/15/2024), or if symptoms worsen or fail to improve.        Patient was given instructions and counseling regarding her condition or for health maintenance advice. Please see specific information pulled into the AVS if appropriate.   I have reviewed information obtained and documented by others and I have confirmed the accuracy of this documented note.    Chanel Bunch APRN

## 2024-04-12 DIAGNOSIS — I10 HYPERTENSION, UNSPECIFIED TYPE: ICD-10-CM

## 2024-04-12 RX ORDER — LISINOPRIL 10 MG/1
10 TABLET ORAL DAILY
Qty: 30 TABLET | Refills: 1 | OUTPATIENT
Start: 2024-04-12

## 2024-04-23 ENCOUNTER — OFFICE VISIT (OUTPATIENT)
Dept: FAMILY MEDICINE CLINIC | Facility: CLINIC | Age: 79
End: 2024-04-23
Payer: MEDICARE

## 2024-04-23 VITALS
OXYGEN SATURATION: 97 % | DIASTOLIC BLOOD PRESSURE: 54 MMHG | HEART RATE: 81 BPM | WEIGHT: 224 LBS | BODY MASS INDEX: 39.69 KG/M2 | SYSTOLIC BLOOD PRESSURE: 127 MMHG | HEIGHT: 63 IN

## 2024-04-23 DIAGNOSIS — I10 HYPERTENSION, UNSPECIFIED TYPE: Primary | ICD-10-CM

## 2024-04-23 DIAGNOSIS — R73.03 PREDIABETES: ICD-10-CM

## 2024-04-23 DIAGNOSIS — E66.01 CLASS 2 SEVERE OBESITY WITH SERIOUS COMORBIDITY AND BODY MASS INDEX (BMI) OF 39.0 TO 39.9 IN ADULT, UNSPECIFIED OBESITY TYPE: ICD-10-CM

## 2024-04-23 PROBLEM — E66.812 CLASS 2 SEVERE OBESITY WITH SERIOUS COMORBIDITY AND BODY MASS INDEX (BMI) OF 39.0 TO 39.9 IN ADULT: Status: ACTIVE | Noted: 2024-03-18

## 2024-04-23 PROCEDURE — 1160F RVW MEDS BY RX/DR IN RCRD: CPT | Performed by: NURSE PRACTITIONER

## 2024-04-23 PROCEDURE — G2211 COMPLEX E/M VISIT ADD ON: HCPCS | Performed by: NURSE PRACTITIONER

## 2024-04-23 PROCEDURE — 3074F SYST BP LT 130 MM HG: CPT | Performed by: NURSE PRACTITIONER

## 2024-04-23 PROCEDURE — 99214 OFFICE O/P EST MOD 30 MIN: CPT | Performed by: NURSE PRACTITIONER

## 2024-04-23 PROCEDURE — 3078F DIAST BP <80 MM HG: CPT | Performed by: NURSE PRACTITIONER

## 2024-04-23 PROCEDURE — 1159F MED LIST DOCD IN RCRD: CPT | Performed by: NURSE PRACTITIONER

## 2024-04-23 RX ORDER — LISINOPRIL 10 MG/1
10 TABLET ORAL DAILY
Qty: 90 TABLET | Refills: 1 | Status: SHIPPED | OUTPATIENT
Start: 2024-04-23

## 2024-04-23 NOTE — ASSESSMENT & PLAN NOTE
Patient's (Body mass index is 39.68 kg/m².) indicates that they are morbidly/severely obese (BMI > 40 or > 35 with obesity - related health condition) with health conditions that include hypertension . Weight is improving with lifestyle modifications. BMI  is above average; BMI management plan is completed. We discussed portion control and increasing exercise.  Patient has lost 6 pounds since last month, she will continue to work on diet and exercise changes for weight loss

## 2024-04-23 NOTE — ASSESSMENT & PLAN NOTE
Blood pressure is much improved and impaired is well-controlled with lisinopril, she will continue the current medication and we will follow-up again in 5 months

## 2024-04-23 NOTE — ASSESSMENT & PLAN NOTE
Stable and well-controlled on metformin, she will continue to current medication, labs ordered at previous visit, patient will have these completed at earliest convenience

## 2024-04-23 NOTE — PROGRESS NOTES
"Chief Complaint  Follow-up hypertension    SUBJECTIVE  Leida Pan presents to [facility] for one month follow up on HTN. Pt has not been checking her BP at home, however blood pressure appears well-controlled in clinic today    History of Present Illness  Past Medical History:   Diagnosis Date    Arthritis     Cataract     History of medical problems       Family History   Problem Relation Age of Onset    Cancer Mother     Heart disease Mother     Heart disease Father       Past Surgical History:   Procedure Laterality Date    BARIATRIC SURGERY  2019        Current Outpatient Medications:     atorvastatin (LIPITOR) 10 MG tablet, Take 1 tablet by mouth Daily., Disp: 90 tablet, Rfl: 1    calcium acetate (PHOS BINDER,) 667 MG capsule capsule, Take 2 capsules by mouth 3 (Three) Times a Day., Disp: , Rfl:     Cobalamin Combinations (B-12) 100-5000 MCG sublingual tablet, Place  under the tongue. 1 A WEEK, Disp: , Rfl:     iron polysacch complex-B12-FA (FERREX 150 FORTE) 150-0.025-1 MG capsule capsule, Take  by mouth Daily., Disp: , Rfl:     lisinopril (PRINIVIL,ZESTRIL) 10 MG tablet, Take 1 tablet by mouth Daily., Disp: 90 tablet, Rfl: 1    metFORMIN (GLUCOPHAGE) 500 MG tablet, Take 1 tablet by mouth 2 (Two) Times a Day With Meals., Disp: 180 tablet, Rfl: 1    multivitamin with minerals tablet tablet, Take 1 tablet by mouth Daily., Disp: , Rfl:     triamcinolone (KENALOG) 0.1 % ointment, Apply  topically to the appropriate area as directed., Disp: , Rfl:     OBJECTIVE  Vital Signs:   /54   Pulse 81   Ht 160 cm (63\")   Wt 102 kg (224 lb)   SpO2 97%   BMI 39.68 kg/m²    Estimated body mass index is 39.68 kg/m² as calculated from the following:    Height as of this encounter: 160 cm (63\").    Weight as of this encounter: 102 kg (224 lb).     Wt Readings from Last 3 Encounters:   04/23/24 102 kg (224 lb)   03/18/24 104 kg (230 lb)   10/23/23 101 kg (222 lb 11.2 oz)     BP Readings " from Last 3 Encounters:   04/23/24 127/54   03/18/24 151/72   10/23/23 133/75       Physical Exam  Vitals reviewed.   Constitutional:       Appearance: Normal appearance. She is well-developed.   HENT:      Head: Normocephalic and atraumatic.      Right Ear: External ear normal.      Left Ear: External ear normal.   Eyes:      Conjunctiva/sclera: Conjunctivae normal.      Pupils: Pupils are equal, round, and reactive to light.   Cardiovascular:      Rate and Rhythm: Normal rate and regular rhythm.      Heart sounds: No murmur heard.     No friction rub. No gallop.   Pulmonary:      Effort: Pulmonary effort is normal.      Breath sounds: Normal breath sounds. No wheezing or rhonchi.   Skin:     General: Skin is warm and dry.   Neurological:      Mental Status: She is alert and oriented to person, place, and time.      Cranial Nerves: No cranial nerve deficit.   Psychiatric:         Mood and Affect: Mood and affect normal.         Behavior: Behavior normal.         Thought Content: Thought content normal.         Judgment: Judgment normal.          Result Review    CMP          10/25/2023    08:42   CMP   Glucose 94    BUN 23    Creatinine 0.78    EGFR 78.8    Sodium 142    Potassium 4.2    Chloride 108    Calcium 9.6    Total Protein 7.2    Albumin 4.0    Globulin 3.2    Total Bilirubin 0.4    Alkaline Phosphatase 61    AST (SGOT) 21    ALT (SGPT) 17    Albumin/Globulin Ratio 1.3    BUN/Creatinine Ratio 29.5    Anion Gap 9.0      CBC          10/25/2023    08:42   CBC   WBC 7.38    RBC 4.97    Hemoglobin 14.4    Hematocrit 43.2    MCV 86.9    MCH 29.0    MCHC 33.3    RDW 13.0    Platelets 283      CBC w/diff          10/25/2023    08:42   CBC w/Diff   WBC 7.38    RBC 4.97    Hemoglobin 14.4    Hematocrit 43.2    MCV 86.9    MCH 29.0    MCHC 33.3    RDW 13.0    Platelets 283    Neutrophil Rel % 49.7    Immature Granulocyte Rel % 0.1    Lymphocyte Rel % 39.8    Monocyte Rel % 5.7    Eosinophil Rel % 3.9    Basophil Rel  % 0.8      Lipid Panel          10/25/2023    08:42   Lipid Panel   Total Cholesterol 152    Triglycerides 97    HDL Cholesterol 45    VLDL Cholesterol 18    LDL Cholesterol  89    LDL/HDL Ratio 1.95      Most Recent A1C          10/25/2023    08:42   HGBA1C Most Recent   Hemoglobin A1C 5.70      Lab Results   Component Value Date    EXBC96LT 59.7 10/21/2022           Lab Results   Component Value Date    LKATQMRL79 604 10/25/2023       No Images in the past 120 days found..     The above data has been reviewed by TOI Lemus 04/23/2024 14:16 EDT.          Patient Care Team:  Chanel Bunch APRN as PCP - General (Nurse Practitioner)            ASSESSMENT & PLAN    Diagnoses and all orders for this visit:    1. Hypertension, unspecified type (Primary)  Assessment & Plan:  Blood pressure is much improved and impaired is well-controlled with lisinopril, she will continue the current medication and we will follow-up again in 5 months    Orders:  -     lisinopril (PRINIVIL,ZESTRIL) 10 MG tablet; Take 1 tablet by mouth Daily.  Dispense: 90 tablet; Refill: 1    2. Prediabetes  Assessment & Plan:  Stable and well-controlled on metformin, she will continue to current medication, labs ordered at previous visit, patient will have these completed at earliest convenience      3. Class 2 severe obesity with serious comorbidity and body mass index (BMI) of 39.0 to 39.9 in adult, unspecified obesity type  Assessment & Plan:  Patient's (Body mass index is 39.68 kg/m².) indicates that they are morbidly/severely obese (BMI > 40 or > 35 with obesity - related health condition) with health conditions that include hypertension . Weight is improving with lifestyle modifications. BMI  is above average; BMI management plan is completed. We discussed portion control and increasing exercise.  Patient has lost 6 pounds since last month, she will continue to work on diet and exercise changes for weight loss           Tobacco  Use: Low Risk  (4/23/2024)    Patient History     Smoking Tobacco Use: Never     Smokeless Tobacco Use: Never     Passive Exposure: Not on file       Follow Up     Return in about 5 months (around 9/23/2024), or if symptoms worsen or fail to improve.        Patient was given instructions and counseling regarding her condition or for health maintenance advice. Please see specific information pulled into the AVS if appropriate.   I have reviewed information obtained and documented by others and I have confirmed the accuracy of this documented note.    Chanel Bunch APRN

## 2024-04-29 ENCOUNTER — OFFICE VISIT (OUTPATIENT)
Dept: SLEEP MEDICINE | Facility: HOSPITAL | Age: 79
End: 2024-04-29
Payer: MEDICARE

## 2024-04-29 VITALS
HEART RATE: 80 BPM | SYSTOLIC BLOOD PRESSURE: 138 MMHG | WEIGHT: 224.8 LBS | BODY MASS INDEX: 39.83 KG/M2 | OXYGEN SATURATION: 95 % | DIASTOLIC BLOOD PRESSURE: 70 MMHG | HEIGHT: 63 IN

## 2024-04-29 DIAGNOSIS — G47.33 OBSTRUCTIVE SLEEP APNEA, ADULT: Primary | ICD-10-CM

## 2024-04-29 DIAGNOSIS — E66.01 CLASS 2 SEVERE OBESITY WITH SERIOUS COMORBIDITY AND BODY MASS INDEX (BMI) OF 39.0 TO 39.9 IN ADULT, UNSPECIFIED OBESITY TYPE: ICD-10-CM

## 2024-04-29 PROCEDURE — 1159F MED LIST DOCD IN RCRD: CPT | Performed by: NURSE PRACTITIONER

## 2024-04-29 PROCEDURE — 99213 OFFICE O/P EST LOW 20 MIN: CPT | Performed by: NURSE PRACTITIONER

## 2024-04-29 PROCEDURE — 1160F RVW MEDS BY RX/DR IN RCRD: CPT | Performed by: NURSE PRACTITIONER

## 2024-04-29 PROCEDURE — 3078F DIAST BP <80 MM HG: CPT | Performed by: NURSE PRACTITIONER

## 2024-04-29 PROCEDURE — 3075F SYST BP GE 130 - 139MM HG: CPT | Performed by: NURSE PRACTITIONER

## 2024-04-29 PROCEDURE — G0463 HOSPITAL OUTPT CLINIC VISIT: HCPCS

## 2024-04-29 NOTE — PROGRESS NOTES
"    [address] [address]  Phone: [phone]  Fax: [phone]        SLEEP CLINIC FOLLOW-UP PROGRESS NOTE    Leida Pan  [id number]   1945  78 y.o.  female      PCP: Chanel Bunch APRN    DATE OF VISIT: 4/29/2024          CHIEF COMPLAINT: Obstructive sleep apnea    HPI:  This is a 78 y.o. year old patient who presents to the clinic today for the management of obstructive sleep apnea. She previously had a sleep study in [facility] in 2012 showing AHI of 7.1/h and she was started on CPAP.  She establish care with our office 10/2023.  Has gotten a new CPAP within the last few years after her Yonathan 1 was recalled.  She continues to have great usage of CPAP with greater than 4-hour usage for the last 30 days at 97%.  Sleep apnea well treated with residual AHI of 0.7/h.  She continues to tolerate device pressures well.  She does have some mask leakage.  She thinks her face created causes the mask to leak.  We discussed mask liner.  We also discussed possible chinstrap as she could be having contributing.  If mask leak continues she will reach out to [employer] for mask fitting.          MEDICATIONS: reviewed     ALLERGIES:  Erythromycin base    SOCIAL HISTORY (habits pertaining to sleep medicine):  Tobacco use: No   Alcohol use: 0 per week  Caffeine use: 0     REVIEW OF SYSTEMS:   Pertinent positive symptoms are:  [place] Sleepiness Scale :Total score: 1   Mouth/nose--- discussed chinstrap through [employer]        PHYSICAL EXAMINATION:  CONSTITUTIONAL:  Vitals:    04/29/24 1100   BP: 138/70   Pulse: 80   SpO2: 95%   Weight: 102 kg (224 lb 12.8 oz)   Height: 160 cm (62.99\")    Body mass index is 39.83 kg/m².   HEAD: atraumatic, normocephalic  RESP SYSTEM: not in respiratory distress, breathing unlabored  CARDIOVASULAR: normal rate, no edema noted   NEURO: Alert and oriented x 3, mood and affect appeared appropriate      DATA " REVIEWED:  The PAP compliance summary downloaded on 4/16/2024 has been reviewed independently by me and discussed with the patient.   Compliance: 100%  More than 4 hr use: 97%  Average use of the device: 9 hours 9 minutes per night  Residual AHI: 0.7 /hr (goal < 5.0 /hr)  Device: ResMed AirSense 10  Mask type: Full nasal mask  DME: [employer]          ASSESSMENT AND PLAN:  Obstructive Sleep Apnea: sleep apnea has improved with the device and treatment.  Patient has excellent compliance with the device for treatment of sleep apnea.  I have personally reviewed the smart card download and discussed the download data with the patient and encouarged continued use of the device.  The residual AHI is acceptable. The device is benefiting the patient and the device is medically necessary.  Recommend patient get supplies from the DME company, change them on a regular basis, and clean as directed.  A prescription for supplies has been sent to the [employer].  Also recommend using CPAP a minimum of 4 hours per night to meet insurance criteria, all night every night recommended for optimum health.  Recommend prioritizing sleep to aid in overall health.  She will get chinstrap through [employer] and address mask leak DME company.  May benefit from mask liners or trial of alternate facemask style if leak continues despite chinstrap.  Obesity: Body mass index is 39.83 kg/m².. Patients who are overweight or obese are at increased risk of sleep apnea/ sleep disordered breathing. Weight reduction and healthy lifestyle are encouraged in overweight/ obese patients as part of a comprehensive approach to sleep apnea treatment.    Prediabetes  History of gastric bypass      Patient will follow-up in 1 year, per patient preference, or follow-up sooner for any issues or concerns.  Patient's questions were answered.          Thank you for allowing me to participate in the care of this patient.     Charline Ashton DNP, APRN  [facility]  [facility]                 Show Aperture Variable?: Yes Detail Level: Detailed Render Post-Care Instructions In Note?: no Consent: The patient's consent was obtained including but not limited to risks of crusting, scabbing, blistering, scarring, darker or lighter pigmentary change, recurrence, incomplete removal and infection. Duration Of Freeze Thaw-Cycle (Seconds): 3 Post-Care Instructions: I reviewed with the patient in detail post-care instructions. Patient is to wear sunprotection, and avoid picking at any of the treated lesions. Pt may apply Vaseline to crusted or scabbing areas. Number Of Freeze-Thaw Cycles: 1 freeze-thaw cycle

## 2024-05-29 ENCOUNTER — HOSPITAL ENCOUNTER (OUTPATIENT)
Dept: MAMMOGRAPHY | Facility: HOSPITAL | Age: 79
Discharge: HOME | End: 2024-05-29
Admitting: NURSE PRACTITIONER
Payer: MEDICARE

## 2024-05-29 DIAGNOSIS — Z12.31 SCREENING MAMMOGRAM FOR BREAST CANCER: ICD-10-CM

## 2024-05-29 PROCEDURE — 77067 SCR MAMMO BI INCL CAD: CPT

## 2024-05-29 PROCEDURE — 77063 BREAST TOMOSYNTHESIS BI: CPT

## 2024-07-05 ENCOUNTER — TELEPHONE (OUTPATIENT)
Dept: FAMILY MEDICINE CLINIC | Facility: CLINIC | Age: 79
End: 2024-07-05
Payer: MEDICARE

## 2024-07-08 ENCOUNTER — LAB (OUTPATIENT)
Dept: LAB | Facility: HOSPITAL | Age: 79
End: 2024-07-08
Payer: MEDICARE

## 2024-07-08 DIAGNOSIS — I10 HYPERTENSION, UNSPECIFIED TYPE: ICD-10-CM

## 2024-07-08 DIAGNOSIS — R73.03 PREDIABETES: ICD-10-CM

## 2024-07-08 DIAGNOSIS — Z13.29 THYROID DISORDER SCREEN: ICD-10-CM

## 2024-07-08 LAB
ALBUMIN SERPL-MCNC: 4.1 G/DL (ref 3.5–5.2)
ALBUMIN/GLOB SERPL: 1.3 G/DL
ALP SERPL-CCNC: 66 U/L (ref 39–117)
ALT SERPL W P-5'-P-CCNC: 17 U/L (ref 1–33)
ANION GAP SERPL CALCULATED.3IONS-SCNC: 10 MMOL/L (ref 5–15)
AST SERPL-CCNC: 19 U/L (ref 1–32)
BASOPHILS # BLD AUTO: 0.04 10*3/MM3 (ref 0–0.2)
BASOPHILS NFR BLD AUTO: 0.4 % (ref 0–1.5)
BILIRUB SERPL-MCNC: 0.4 MG/DL (ref 0–1.2)
BUN SERPL-MCNC: 24 MG/DL (ref 8–23)
BUN/CREAT SERPL: 31.2 (ref 7–25)
CALCIUM SPEC-SCNC: 9.8 MG/DL (ref 8.6–10.5)
CHLORIDE SERPL-SCNC: 104 MMOL/L (ref 98–107)
CHOLEST SERPL-MCNC: 162 MG/DL (ref 0–200)
CO2 SERPL-SCNC: 27 MMOL/L (ref 22–29)
CREAT SERPL-MCNC: 0.77 MG/DL (ref 0.57–1)
DEPRECATED RDW RBC AUTO: 44.7 FL (ref 37–54)
EGFRCR SERPLBLD CKD-EPI 2021: 80.1 ML/MIN/1.73
EOSINOPHIL # BLD AUTO: 0.26 10*3/MM3 (ref 0–0.4)
EOSINOPHIL NFR BLD AUTO: 2.9 % (ref 0.3–6.2)
ERYTHROCYTE [DISTWIDTH] IN BLOOD BY AUTOMATED COUNT: 13.6 % (ref 12.3–15.4)
GLOBULIN UR ELPH-MCNC: 3.1 GM/DL
GLUCOSE SERPL-MCNC: 94 MG/DL (ref 65–99)
HBA1C MFR BLD: 5.7 % (ref 4.8–5.6)
HCT VFR BLD AUTO: 43.7 % (ref 34–46.6)
HDLC SERPL-MCNC: 52 MG/DL (ref 40–60)
HGB BLD-MCNC: 14.4 G/DL (ref 12–15.9)
IMM GRANULOCYTES # BLD AUTO: 0.01 10*3/MM3 (ref 0–0.05)
IMM GRANULOCYTES NFR BLD AUTO: 0.1 % (ref 0–0.5)
LDLC SERPL CALC-MCNC: 88 MG/DL (ref 0–100)
LDLC/HDLC SERPL: 1.63 {RATIO}
LYMPHOCYTES # BLD AUTO: 3.59 10*3/MM3 (ref 0.7–3.1)
LYMPHOCYTES NFR BLD AUTO: 39.8 % (ref 19.6–45.3)
MCH RBC QN AUTO: 29.8 PG (ref 26.6–33)
MCHC RBC AUTO-ENTMCNC: 33 G/DL (ref 31.5–35.7)
MCV RBC AUTO: 90.3 FL (ref 79–97)
MONOCYTES # BLD AUTO: 0.52 10*3/MM3 (ref 0.1–0.9)
MONOCYTES NFR BLD AUTO: 5.8 % (ref 5–12)
NEUTROPHILS NFR BLD AUTO: 4.6 10*3/MM3 (ref 1.7–7)
NEUTROPHILS NFR BLD AUTO: 51 % (ref 42.7–76)
NRBC BLD AUTO-RTO: 0 /100 WBC (ref 0–0.2)
PLATELET # BLD AUTO: 271 10*3/MM3 (ref 140–450)
PMV BLD AUTO: 9.8 FL (ref 6–12)
POTASSIUM SERPL-SCNC: 4.2 MMOL/L (ref 3.5–5.2)
PROT SERPL-MCNC: 7.2 G/DL (ref 6–8.5)
RBC # BLD AUTO: 4.84 10*6/MM3 (ref 3.77–5.28)
SODIUM SERPL-SCNC: 141 MMOL/L (ref 136–145)
TRIGL SERPL-MCNC: 125 MG/DL (ref 0–150)
TSH SERPL DL<=0.05 MIU/L-ACNC: 3.19 UIU/ML (ref 0.27–4.2)
VLDLC SERPL-MCNC: 22 MG/DL (ref 5–40)
WBC NRBC COR # BLD AUTO: 9.02 10*3/MM3 (ref 3.4–10.8)

## 2024-07-08 PROCEDURE — 85025 COMPLETE CBC W/AUTO DIFF WBC: CPT

## 2024-07-08 PROCEDURE — 84443 ASSAY THYROID STIM HORMONE: CPT

## 2024-07-08 PROCEDURE — 80061 LIPID PANEL: CPT

## 2024-07-08 PROCEDURE — 36415 COLL VENOUS BLD VENIPUNCTURE: CPT

## 2024-07-08 PROCEDURE — 80053 COMPREHEN METABOLIC PANEL: CPT

## 2024-07-08 PROCEDURE — 83036 HEMOGLOBIN GLYCOSYLATED A1C: CPT

## 2024-09-03 DIAGNOSIS — R73.03 PREDIABETES: ICD-10-CM

## 2024-09-03 DIAGNOSIS — E78.5 HYPERLIPIDEMIA, UNSPECIFIED HYPERLIPIDEMIA TYPE: ICD-10-CM

## 2024-09-03 NOTE — TELEPHONE ENCOUNTER
LIPITOR  LRF 03/18/2024  LOV 04/23/2024  F/U 10/23/2024    METFORMIN  LRF 03/18/2024  LOV 04/23/2024  F/U 10/23/2024

## 2024-09-04 RX ORDER — ATORVASTATIN CALCIUM 10 MG/1
10 TABLET, FILM COATED ORAL DAILY
Qty: 90 TABLET | Refills: 0 | Status: SHIPPED | OUTPATIENT
Start: 2024-09-04

## 2024-10-23 ENCOUNTER — OFFICE VISIT (OUTPATIENT)
Dept: FAMILY MEDICINE CLINIC | Facility: CLINIC | Age: 79
End: 2024-10-23
Payer: MEDICARE

## 2024-10-23 VITALS
SYSTOLIC BLOOD PRESSURE: 130 MMHG | HEART RATE: 69 BPM | BODY MASS INDEX: 40.04 KG/M2 | WEIGHT: 226 LBS | DIASTOLIC BLOOD PRESSURE: 63 MMHG | HEIGHT: 63 IN | OXYGEN SATURATION: 97 %

## 2024-10-23 DIAGNOSIS — I10 HYPERTENSION, UNSPECIFIED TYPE: ICD-10-CM

## 2024-10-23 DIAGNOSIS — E78.5 HYPERLIPIDEMIA, UNSPECIFIED HYPERLIPIDEMIA TYPE: ICD-10-CM

## 2024-10-23 DIAGNOSIS — E66.01 CLASS 2 SEVERE OBESITY WITH SERIOUS COMORBIDITY AND BODY MASS INDEX (BMI) OF 39.0 TO 39.9 IN ADULT, UNSPECIFIED OBESITY TYPE: ICD-10-CM

## 2024-10-23 DIAGNOSIS — R73.03 PREDIABETES: ICD-10-CM

## 2024-10-23 DIAGNOSIS — R05.3 CHRONIC COUGH: ICD-10-CM

## 2024-10-23 DIAGNOSIS — Z00.00 MEDICARE ANNUAL WELLNESS VISIT, SUBSEQUENT: Primary | ICD-10-CM

## 2024-10-23 DIAGNOSIS — E66.812 CLASS 2 SEVERE OBESITY WITH SERIOUS COMORBIDITY AND BODY MASS INDEX (BMI) OF 39.0 TO 39.9 IN ADULT, UNSPECIFIED OBESITY TYPE: ICD-10-CM

## 2024-10-23 PROCEDURE — 3075F SYST BP GE 130 - 139MM HG: CPT | Performed by: NURSE PRACTITIONER

## 2024-10-23 PROCEDURE — 99214 OFFICE O/P EST MOD 30 MIN: CPT | Performed by: NURSE PRACTITIONER

## 2024-10-23 PROCEDURE — 1159F MED LIST DOCD IN RCRD: CPT | Performed by: NURSE PRACTITIONER

## 2024-10-23 PROCEDURE — 3078F DIAST BP <80 MM HG: CPT | Performed by: NURSE PRACTITIONER

## 2024-10-23 PROCEDURE — G0439 PPPS, SUBSEQ VISIT: HCPCS | Performed by: NURSE PRACTITIONER

## 2024-10-23 PROCEDURE — 1160F RVW MEDS BY RX/DR IN RCRD: CPT | Performed by: NURSE PRACTITIONER

## 2024-10-23 PROCEDURE — 1125F AMNT PAIN NOTED PAIN PRSNT: CPT | Performed by: NURSE PRACTITIONER

## 2024-10-23 PROCEDURE — 1170F FXNL STATUS ASSESSED: CPT | Performed by: NURSE PRACTITIONER

## 2024-10-23 RX ORDER — LISINOPRIL 10 MG/1
10 TABLET ORAL DAILY
Qty: 90 TABLET | Refills: 1 | Status: CANCELLED | OUTPATIENT
Start: 2024-10-23

## 2024-10-23 RX ORDER — ATORVASTATIN CALCIUM 10 MG/1
10 TABLET, FILM COATED ORAL DAILY
Qty: 90 TABLET | Refills: 1 | Status: SHIPPED | OUTPATIENT
Start: 2024-10-23

## 2024-10-23 RX ORDER — LOSARTAN POTASSIUM 25 MG/1
25 TABLET ORAL DAILY
Qty: 90 TABLET | Refills: 0 | Status: SHIPPED | OUTPATIENT
Start: 2024-10-23

## 2024-10-23 RX ORDER — LISINOPRIL 10 MG/1
10 TABLET ORAL DAILY
Qty: 90 TABLET | Refills: 1 | OUTPATIENT
Start: 2024-10-23

## 2024-10-23 NOTE — ASSESSMENT & PLAN NOTE
After discussion we have decided to discontinue lisinopril due to suspected cough, we are going to trial losartan, side effects administration medication discussed, we will follow-up in a few months for reevaluation

## 2024-10-23 NOTE — PROGRESS NOTES
The ABCs of the Annual Wellness Visit  Medicare Wellness Visit    Subjective    Leida Pan is a 79 y.o. female who presents for a Medicare Wellness Visit.    The following portions of the patient's history were reviewed and   updated as appropriate: allergies, current medications, past family history, past medical history, past social history, past surgical history, and problem list.    Compared to one year ago, the patient feels her physical   health is the same.    Compared to one year ago, the patient feels her mental   health is the same.    Recent Hospitalizations:  She was not admitted to the hospital during the last year.       Current Medical Providers:  Patient Care Team:  Chanel Bunch APRN as PCP - General (Nurse Practitioner)    Outpatient Medications Prior to Visit   Medication Sig Dispense Refill    calcium acetate (PHOS BINDER,) 667 MG capsule capsule Take 2 capsules by mouth 3 (Three) Times a Day.      Cobalamin Combinations (B-12) 100-5000 MCG sublingual tablet Place  under the tongue. 1 A WEEK      iron polysacch complex-B12-FA (FERREX 150 FORTE) 150-0.025-1 MG capsule capsule Take  by mouth Daily.      multivitamin with minerals tablet tablet Take 1 tablet by mouth Daily.      triamcinolone (KENALOG) 0.1 % ointment Apply  topically to the appropriate area as directed.      atorvastatin (LIPITOR) 10 MG tablet TAKE 1 TABLET BY MOUTH EVERY DAY 90 tablet 0    lisinopril (PRINIVIL,ZESTRIL) 10 MG tablet Take 1 tablet by mouth Daily. 90 tablet 1    metFORMIN (GLUCOPHAGE) 500 MG tablet TAKE 1 TABLET BY MOUTH TWICE A DAY WITH MEALS 180 tablet 0     No facility-administered medications prior to visit.       No opioid medication identified on active medication list. I have reviewed chart for other potential  high risk medication/s and harmful drug interactions in the elderly.        Aspirin is not on active medication list.  Aspirin use is not indicated based on review of current medical condition/s.  "Risk of harm outweighs potential benefits.  .    Patient Active Problem List   Diagnosis    Elevated blood pressure reading    Prediabetes    Hyperlipidemia    Sleep apnea    Class 2 severe obesity with serious comorbidity and body mass index (BMI) of 39.0 to 39.9 in adult    Hypertension     Advance Care Planning  Advance Directive is on file.  ACP discussion was held with the patient during this visit. Patient has an advance directive in EMR which is still valid.        Objective    Vitals:    10/23/24 0924   BP: 130/63   Pulse: 69   SpO2: 97%   Weight: 103 kg (226 lb)   Height: 160 cm (62.99\")     Estimated body mass index is 40.05 kg/m² as calculated from the following:    Height as of this encounter: 160 cm (62.99\").    Weight as of this encounter: 103 kg (226 lb).        Gait and Balance Evaluation: Normal    Does the patient have evidence of cognitive impairment? No            HEALTH RISK ASSESSMENT    Smoking Status:  Social History     Tobacco Use   Smoking Status Never   Smokeless Tobacco Never     Alcohol Consumption:  Social History     Substance and Sexual Activity   Alcohol Use Not Currently     Fall Risk Screen:  STEADI Fall Risk Assessment was completed, and patient is at LOW risk for falls.Assessment completed on:10/23/2024    Depression Screening:      10/23/2024     9:00 AM   PHQ-2/PHQ-9 Depression Screening   Little interest or pleasure in doing things Not at all   Feeling down, depressed, or hopeless Not at all       Health Habits and Functional and Cognitive Screening:      10/23/2024     9:00 AM   Functional & Cognitive Status   Do you have difficulty preparing food and eating? No   Do you have difficulty bathing yourself, getting dressed or grooming yourself? No   Do you have difficulty using the toilet? No   Do you have difficulty moving around from place to place? No   Do you have trouble with steps or getting out of a bed or a chair? No   Current Diet Well Balanced Diet   Dental Exam Up to " date   Eye Exam Up to date   Exercise (times per week) 3 times per week   Current Exercises Include Walking   Do you need help using the phone?  No   Are you deaf or do you have serious difficulty hearing?  No   Do you need help to go to places out of walking distance? No   Do you need help shopping? No   Do you need help preparing meals?  No   Do you need help with housework?  No   Do you need help with laundry? No   Do you need help taking your medications? No   Do you need help managing money? No   Do you ever drive or ride in a car without wearing a seat belt? No   Have you felt unusual stress, anger or loneliness in the last month? No   Who do you live with? Alone   If you need help, do you have trouble finding someone available to you? No   Have you been bothered in the last four weeks by sexual problems? No   Do you have difficulty concentrating, remembering or making decisions? No         Age-appropriate Screening Schedule:  Refer to the list below for future screening recommendations based on patient's age, sex and/or medical conditions. Orders for these recommended tests are listed in the plan section. The patient has been provided with a written plan.    Health Maintenance   Topic Date Due    ZOSTER VACCINE (2 of 2) 11/09/2020    RSV Vaccine - Adults (1 - 1-dose 75+ series) Never done    HEPATITIS C SCREENING  Never done    COVID-19 Vaccine (6 - 2023-24 season) 09/01/2024    Pneumococcal Vaccine 65+ (1 of 1 - PCV) 10/23/2025 (Originally 7/25/2012)    LIPID PANEL  07/08/2025    DXA SCAN  08/21/2025    ANNUAL WELLNESS VISIT  10/23/2025    BMI FOLLOWUP  10/23/2025    TDAP/TD VACCINES (2 - Td or Tdap) 01/08/2030    INFLUENZA VACCINE  Completed    COLORECTAL CANCER SCREENING  Discontinued                CMS Preventative Services Quick Reference  Risk Factors Identified During Encounter  None Identified  The above risks/problems have been discussed with the patient.  Pertinent information has been shared with  the patient in the After Visit Summary.  An After Visit Summary and PPPS were made available to the patient.      The patient is advised to begin progressive daily aerobic exercise program, follow a low fat, low cholesterol diet, attempt to lose weight, continue current medications, and return for routine annual checkups.   Follow Up:   Next Medicare Wellness visit to be scheduled in 1 year.       Additional E&M Note during same encounter follows:  Patient has multiple medical problems which are significant and separately identifiable that require additional work above and beyond the Medicare Wellness Visit.        Chief Complaint  Medicare Wellness-subsequent, Hypertension, Diabetes, and Hyperlipidemia    SUBJECTIVE  Leida Pan presents to [facility]    Patient here today for Medicare annual wellness, follow-up hypertension, diabetes.  Patient reports that she has had a cough for the last 3 or so months, states that she feels like it may have been going on since she started the lisinopril.  Nonproductive, does seem to be worse in the evenings    History of Present Illness  Past Medical History:   Diagnosis Date    Arthritis     Cataract     History of medical problems       Family History   Problem Relation Age of Onset    Cancer Mother     Heart disease Mother     Heart disease Father       Past Surgical History:   Procedure Laterality Date    BARIATRIC SURGERY  2019        Current Outpatient Medications:     atorvastatin (LIPITOR) 10 MG tablet, Take 1 tablet by mouth Daily., Disp: 90 tablet, Rfl: 1    calcium acetate (PHOS BINDER,) 667 MG capsule capsule, Take 2 capsules by mouth 3 (Three) Times a Day., Disp: , Rfl:     Cobalamin Combinations (B-12) 100-5000 MCG sublingual tablet, Place  under the tongue. 1 A WEEK, Disp: , Rfl:     iron polysacch complex-B12-FA (FERREX 150 FORTE) 150-0.025-1 MG capsule capsule, Take  by mouth Daily., Disp: , Rfl:     metFORMIN (GLUCOPHAGE) 500  "MG tablet, Take 1 tablet by mouth 2 (Two) Times a Day With Meals., Disp: 180 tablet, Rfl: 1    multivitamin with minerals tablet tablet, Take 1 tablet by mouth Daily., Disp: , Rfl:     triamcinolone (KENALOG) 0.1 % ointment, Apply  topically to the appropriate area as directed., Disp: , Rfl:     losartan (Cozaar) 25 MG tablet, Take 1 tablet by mouth Daily., Disp: 90 tablet, Rfl: 0    OBJECTIVE  Vital Signs:   /63   Pulse 69   Ht 160 cm (62.99\")   Wt 103 kg (226 lb)   SpO2 97%   BMI 40.05 kg/m²    Estimated body mass index is 40.05 kg/m² as calculated from the following:    Height as of this encounter: 160 cm (62.99\").    Weight as of this encounter: 103 kg (226 lb).     Wt Readings from Last 3 Encounters:   10/23/24 103 kg (226 lb)   04/29/24 102 kg (224 lb 12.8 oz)   04/23/24 102 kg (224 lb)     BP Readings from Last 3 Encounters:   10/23/24 130/63   04/29/24 138/70   04/23/24 127/54       Physical Exam  Vitals reviewed.   Constitutional:       Appearance: Normal appearance. She is well-developed.   HENT:      Head: Normocephalic and atraumatic.      Right Ear: External ear normal.      Left Ear: External ear normal.   Eyes:      Conjunctiva/sclera: Conjunctivae normal.      Pupils: Pupils are equal, round, and reactive to light.   Cardiovascular:      Rate and Rhythm: Normal rate and regular rhythm.      Heart sounds: No murmur heard.     No friction rub. No gallop.   Pulmonary:      Effort: Pulmonary effort is normal.      Breath sounds: Normal breath sounds. No wheezing or rhonchi.   Skin:     General: Skin is warm and dry.   Neurological:      Mental Status: She is alert and oriented to person, place, and time.      Cranial Nerves: No cranial nerve deficit.   Psychiatric:         Mood and Affect: Mood and affect normal.         Behavior: Behavior normal.         Thought Content: Thought content normal.         Judgment: Judgment normal.          Result Review    [facility]          7/8/2024    09:58   [facility] "   Glucose 94    BUN 24    Creatinine 0.77    EGFR 80.1    Sodium 141    Potassium 4.2    Chloride 104    Calcium 9.8    Total Protein 7.2    Albumin 4.1    Globulin 3.1    Total Bilirubin 0.4    Alkaline Phosphatase 66    AST (SGOT) 19    ALT (SGPT) 17    Albumin/Globulin Ratio 1.3    BUN/Creatinine Ratio 31.2    Anion Gap 10.0      CBC          7/8/2024    09:58   CBC   WBC 9.02    RBC 4.84    Hemoglobin 14.4    Hematocrit 43.7    MCV 90.3    MCH 29.8    MCHC 33.0    RDW 13.6    Platelets 271      Lipid Panel          7/8/2024    09:58   Lipid Panel   Total Cholesterol 162    Triglycerides 125    HDL Cholesterol 52    VLDL Cholesterol 22    LDL Cholesterol  88    LDL/HDL Ratio 1.63      TSH          7/8/2024    09:58   TSH   TSH 3.190      Most Recent A1C          7/8/2024    09:58   HGBA1C Most Recent   Hemoglobin A1C 5.70      Lab Results   Component Value Date    MYOD39UZ 59.7 10/21/2022           Lab Results   Component Value Date    UVNXVCWR70 604 10/25/2023       No Images in the past 120 days found..      The above data has been reviewed by TOI Lemus 10/23/2024 09:14 EDT.          Patient Care Team:  Chanel Bunch APRN as PCP - General (Nurse Practitioner)             ASSESSMENT & PLAN    Diagnoses and all orders for this visit:    1. Medicare annual wellness visit, subsequent (Primary)    2. Hyperlipidemia, unspecified hyperlipidemia type  Overview:  Stable on atorvastatin, continue current medication    Orders:  -     atorvastatin (LIPITOR) 10 MG tablet; Take 1 tablet by mouth Daily.  Dispense: 90 tablet; Refill: 1    3. Prediabetes  -     metFORMIN (GLUCOPHAGE) 500 MG tablet; Take 1 tablet by mouth 2 (Two) Times a Day With Meals.  Dispense: 180 tablet; Refill: 1    4. Hypertension, unspecified type  Assessment & Plan:  After discussion we have decided to discontinue lisinopril due to suspected cough, we are going to trial losartan, side effects administration medication discussed, we  will follow-up in a few months for reevaluation    Orders:  -     losartan (Cozaar) 25 MG tablet; Take 1 tablet by mouth Daily.  Dispense: 90 tablet; Refill: 0    5. Chronic cough  -     XR Chest 2 View; Future    6. Class 2 severe obesity with serious comorbidity and body mass index (BMI) of 39.0 to 39.9 in adult, unspecified obesity type  Assessment & Plan:  Patient's (Body mass index is 40.05 kg/m².) indicates that they are morbidly/severely obese (BMI > 40 or > 35 with obesity - related health condition) with health conditions that include hypertension and dyslipidemias . Weight is unchanged. BMI  is above average; BMI management plan is completed. We discussed portion control and increasing exercise.            Tobacco Use: Low Risk  (10/23/2024)    Patient History     Smoking Tobacco Use: Never     Smokeless Tobacco Use: Never     Passive Exposure: Not on file       Follow Up     Return in about 2 months (around 12/23/2024), or if symptoms worsen or fail to improve.      Patient was given instructions and counseling regarding her condition or for health maintenance advice. Please see specific information pulled into the AVS if appropriate.   I have reviewed information obtained and documented by others and I have confirmed the accuracy of this documented note.    TOI Lemus

## 2024-10-23 NOTE — ASSESSMENT & PLAN NOTE
Patient's (Body mass index is 40.05 kg/m².) indicates that they are morbidly/severely obese (BMI > 40 or > 35 with obesity - related health condition) with health conditions that include hypertension and dyslipidemias . Weight is unchanged. BMI  is above average; BMI management plan is completed. We discussed portion control and increasing exercise.

## 2024-10-27 ENCOUNTER — APPOINTMENT (OUTPATIENT)
Facility: HOSPITAL | Age: 79
End: 2024-10-27
Payer: MEDICARE

## 2024-10-27 ENCOUNTER — HOSPITAL ENCOUNTER (EMERGENCY)
Facility: HOSPITAL | Age: 79
Discharge: HOME | End: 2024-10-27
Attending: EMERGENCY MEDICINE | Admitting: EMERGENCY MEDICINE
Payer: MEDICARE

## 2024-10-27 VITALS
WEIGHT: 226.85 LBS | BODY MASS INDEX: 40.2 KG/M2 | HEIGHT: 63 IN | RESPIRATION RATE: 18 BRPM | HEART RATE: 94 BPM | DIASTOLIC BLOOD PRESSURE: 97 MMHG | OXYGEN SATURATION: 96 % | SYSTOLIC BLOOD PRESSURE: 178 MMHG | TEMPERATURE: 98.1 F

## 2024-10-27 DIAGNOSIS — M79.604 RIGHT LEG PAIN: Primary | ICD-10-CM

## 2024-10-27 LAB
BH CV LOWER VASCULAR LEFT COMMON FEMORAL AUGMENT: NORMAL
BH CV LOWER VASCULAR LEFT COMMON FEMORAL COMPETENT: NORMAL
BH CV LOWER VASCULAR LEFT COMMON FEMORAL COMPRESS: NORMAL
BH CV LOWER VASCULAR LEFT COMMON FEMORAL PHASIC: NORMAL
BH CV LOWER VASCULAR LEFT COMMON FEMORAL SPONT: NORMAL
BH CV LOWER VASCULAR RIGHT COMMON FEMORAL AUGMENT: NORMAL
BH CV LOWER VASCULAR RIGHT COMMON FEMORAL COMPETENT: NORMAL
BH CV LOWER VASCULAR RIGHT COMMON FEMORAL COMPRESS: NORMAL
BH CV LOWER VASCULAR RIGHT COMMON FEMORAL PHASIC: NORMAL
BH CV LOWER VASCULAR RIGHT COMMON FEMORAL SPONT: NORMAL
BH CV LOWER VASCULAR RIGHT DISTAL FEMORAL COMPRESS: NORMAL
BH CV LOWER VASCULAR RIGHT GASTRONEMIUS COMPRESS: NORMAL
BH CV LOWER VASCULAR RIGHT GREATER SAPH AK COMPRESS: NORMAL
BH CV LOWER VASCULAR RIGHT GREATER SAPH BK COMPRESS: NORMAL
BH CV LOWER VASCULAR RIGHT MID FEMORAL AUGMENT: NORMAL
BH CV LOWER VASCULAR RIGHT MID FEMORAL COMPETENT: NORMAL
BH CV LOWER VASCULAR RIGHT MID FEMORAL COMPRESS: NORMAL
BH CV LOWER VASCULAR RIGHT MID FEMORAL PHASIC: NORMAL
BH CV LOWER VASCULAR RIGHT MID FEMORAL SPONT: NORMAL
BH CV LOWER VASCULAR RIGHT PERONEAL COMPRESS: NORMAL
BH CV LOWER VASCULAR RIGHT POPLITEAL AUGMENT: NORMAL
BH CV LOWER VASCULAR RIGHT POPLITEAL COMPETENT: NORMAL
BH CV LOWER VASCULAR RIGHT POPLITEAL COMPRESS: NORMAL
BH CV LOWER VASCULAR RIGHT POPLITEAL PHASIC: NORMAL
BH CV LOWER VASCULAR RIGHT POPLITEAL SPONT: NORMAL
BH CV LOWER VASCULAR RIGHT POSTERIOR TIBIAL COMPRESS: NORMAL
BH CV LOWER VASCULAR RIGHT PROXIMAL FEMORAL COMPRESS: NORMAL
BH CV LOWER VASCULAR RIGHT SAPHENOFEMORAL JUNCTION COMPRESS: NORMAL

## 2024-10-27 PROCEDURE — 93971 EXTREMITY STUDY: CPT

## 2024-10-27 PROCEDURE — 99284 EMERGENCY DEPT VISIT MOD MDM: CPT

## 2024-10-27 PROCEDURE — 25010000002 DEXAMETHASONE SODIUM PHOSPHATE 10 MG/ML SOLUTION

## 2024-10-27 PROCEDURE — 25010000002 KETOROLAC TROMETHAMINE PER 15 MG

## 2024-10-27 PROCEDURE — 93971 EXTREMITY STUDY: CPT | Performed by: SURGERY

## 2024-10-27 PROCEDURE — 96372 THER/PROPH/DIAG INJ SC/IM: CPT

## 2024-10-27 RX ORDER — KETOROLAC TROMETHAMINE 30 MG/ML
30 INJECTION, SOLUTION INTRAMUSCULAR; INTRAVENOUS ONCE
Status: COMPLETED | OUTPATIENT
Start: 2024-10-27 | End: 2024-10-27

## 2024-10-27 RX ORDER — DEXAMETHASONE SODIUM PHOSPHATE 10 MG/ML
10 INJECTION, SOLUTION INTRAMUSCULAR; INTRAVENOUS ONCE
Status: COMPLETED | OUTPATIENT
Start: 2024-10-27 | End: 2024-10-27

## 2024-10-27 RX ORDER — PREDNISONE 50 MG/1
50 TABLET ORAL DAILY
Qty: 5 TABLET | Refills: 0 | Status: SHIPPED | OUTPATIENT
Start: 2024-10-27

## 2024-10-27 RX ADMIN — DEXAMETHASONE SODIUM PHOSPHATE 10 MG: 10 INJECTION INTRAMUSCULAR; INTRAVENOUS at 12:29

## 2024-10-27 RX ADMIN — KETOROLAC TROMETHAMINE 30 MG: 30 INJECTION, SOLUTION INTRAMUSCULAR; INTRAVENOUS at 12:29

## 2024-10-27 NOTE — DISCHARGE INSTRUCTIONS
Take anti-inflammatories and steroids as prescribed for pain in your right leg.  Your ultrasound completed in the emergency department today does not show any blood clot.  Follow-up with your primary care provider symptoms are improving within 3 to 4 days.  Return to the emergency department for new or worsening symptoms including chest pain, shortness of breath, syncope.

## 2024-10-27 NOTE — ED PROVIDER NOTES
"Time: 10:33 AM EDT  Date of encounter:  10/27/2024  Independent Historian/Clinical History and Information was obtained by:   Patient    History is limited by: N/A    Chief Complaint: Leg pain      History of Present Illness:  Patient is a 79 y.o. year old female who presents to the emergency department for evaluation of leg pain.  Patient presents to the emergency department today for right leg pain.  She states last night that she noticed a warm sensation in the posterior portion of her thigh and this morning she noticed that the pain is throughout the entire leg especially when she bears weight on the foot.  She states when she is not bearing weight now most the pain seems to be in the bottom of the foot inside of the back of the thigh.  Patient's main concern is that she may have a DVT as she does have history of this.  Denies any recent travel, did tell RN that she had traveled approximately 1 month ago to [place].  She states to me that she has been very sedentary for the past couple of weeks and did receive her RSV vaccine this week so she has just not been walking as much as she normally does.  Patient denies any swelling of the leg.  She has not had any color change.  Patient does state that she has back pain but this is chronic and unchanged.      Patient Care Team  Primary Care Provider: Chanel Bunch APRN    Past Medical History:     Allergies   Allergen Reactions    Erythromycin Base Hallucinations     \"Turns me into a zombie\"    Tramadol Rash     Past Medical History:   Diagnosis Date    Arthritis     Cataract     History of medical problems      Past Surgical History:   Procedure Laterality Date    BARIATRIC SURGERY  2019     Family History   Problem Relation Age of Onset    Cancer Mother     Heart disease Mother     Heart disease Father        Home Medications:  Prior to Admission medications    Medication Sig Start Date End Date Taking? Authorizing Provider   atorvastatin (LIPITOR) 10 MG " "tablet Take 1 tablet by mouth Daily. 10/23/24   Chanel Bunch APRN   calcium acetate (PHOS BINDER,) 667 MG capsule capsule Take 2 capsules by mouth 3 (Three) Times a Day.    Abdiel Xiao MD   Cobalamin Combinations (B-12) 100-5000 MCG sublingual tablet Place  under the tongue. 1 A WEEK    Abdiel Xiao MD   iron polysacch complex-B12-FA (FERREX 150 FORTE) 150-0.025-1 MG capsule capsule Take  by mouth Daily.    Abdiel Xiao MD   losartan (Cozaar) 25 MG tablet Take 1 tablet by mouth Daily. 10/23/24   Chanel Bunch APRN   metFORMIN (GLUCOPHAGE) 500 MG tablet Take 1 tablet by mouth 2 (Two) Times a Day With Meals. 10/23/24   Chanel Bunch APRN   multivitamin with minerals tablet tablet Take 1 tablet by mouth Daily.    Abdiel Xiao MD   triamcinolone (KENALOG) 0.1 % ointment Apply  topically to the appropriate area as directed.    Abdiel Xiao MD        Social History:   Social History     Tobacco Use    Smoking status: Never    Smokeless tobacco: Never   Vaping Use    Vaping status: Never Used   Substance Use Topics    Alcohol use: Not Currently    Drug use: Never         Review of Systems:  Review of Systems   Respiratory:  Negative for shortness of breath.    Cardiovascular:  Negative for chest pain and leg swelling.   Musculoskeletal:  Positive for arthralgias, back pain and myalgias.   Skin:  Negative for color change.   All other systems reviewed and are negative.       Physical Exam:  /97 (BP Location: Left arm, Patient Position: Sitting)   Pulse 94   Temp 98.1 °F (36.7 °C) (Oral)   Resp 18   Ht 160 cm (63\")   Wt 103 kg (226 lb 13.7 oz)   SpO2 96%   BMI 40.19 kg/m²     Physical Exam  Vitals and nursing note reviewed.   Constitutional:       Appearance: Normal appearance. She is obese.   HENT:      Head: Normocephalic and atraumatic.      Nose: Nose normal.   Eyes:      Conjunctiva/sclera: Conjunctivae normal.   Cardiovascular:      Rate " and Rhythm: Normal rate and regular rhythm.      Pulses:           Dorsalis pedis pulses are 2+ on the right side and 2+ on the left side.      Heart sounds: Normal heart sounds.   Pulmonary:      Effort: Pulmonary effort is normal.      Breath sounds: Normal breath sounds.   Abdominal:      General: Abdomen is flat. There is no distension.   Musculoskeletal:         General: Normal range of motion.      Cervical back: Normal range of motion and neck supple.      Right upper leg: Normal.      Left upper leg: Normal.      Right lower leg: Normal.      Left lower leg: Normal.      Right foot: Normal.      Left foot: Normal.   Skin:     General: Skin is warm and dry.   Neurological:      General: No focal deficit present.      Mental Status: She is alert and oriented to person, place, and time.   Psychiatric:         Mood and Affect: Mood normal.         Behavior: Behavior normal.         Thought Content: Thought content normal.         Judgment: Judgment normal.                Procedures:  Procedures      Medical Decision Making:    Comorbidities that affect care:    Obesity, hyperlipidemia, hypertension, prediabetes    External Notes reviewed:    Previous Clinic Note: Patient was last seen by family medicine on 10-      The following orders were placed and all results were independently analyzed by me:  Orders Placed This Encounter   Procedures    Repeat vitals prior to discharge  Misc Nursing Order (Specify)       Medications Given in the Emergency Department:  Medications   ketorolac (TORADOL) injection 30 mg (30 mg Intramuscular Given 10/27/24 1229)   dexAMETHasone sodium phosphate injection 10 mg (10 mg Intramuscular Given 10/27/24 1229)        ED Course:    ED Course as of 10/27/24 1344   Sun Oct 27, 2024   1206 Vascular ultrasound reported patient is negative for DVT [MD]      ED Course User Index  [MD] Raphael España PA-C       Labs:    Lab Results (last 24 hours)       ** No results found for the  last 24 hours. **             Imaging:    No Radiology Exams Resulted Within Past 24 Hours      Differential Diagnosis and Discussion:    Extremity Pain: Differential diagnosis includes but is not limited to soft tissue sprain, tendonitis, tendon injury, dislocation, fracture, deep vein thrombosis, arterial insufficiency, osteoarthritis, bursitis, and ligamentous damage.    Ultrasound impression was interpreted by me.     MDM  Number of Diagnoses or Management Options  Right leg pain  Diagnosis management comments: Presented to the emergency department today for evaluation of right leg pain.  Duplex ultrasound was obtained as patient was concerned for DVT as she has history of this.  Ultrasound was negative for DVT or SVT.  Discussed with patient most likely diagnosis is related to sciatica.  Will provide patient with anti-inflammatory and steroids for pain control.       Amount and/or Complexity of Data Reviewed  Tests in the radiology section of CPT®: ordered and reviewed    Risk of Complications, Morbidity, and/or Mortality  Presenting problems: moderate  Diagnostic procedures: low  Management options: low    Patient Progress  Patient progress: stable     Patient Care Considerations:    LABS: I considered ordering labs, however there is no DVT on ultrasound And there is no discoloration or swelling of the leg      Consultants/Shared Management Plan:    None    Social Determinants of Health:    Patient is independent, reliable, and has access to care.       Disposition and Care Coordination:    Discharged: The patient is suitable and stable for discharge with no need for consideration of admission.    I have explained the patient´s condition, diagnoses and treatment plan based on the information available to me at this time. I have answered questions and addressed any concerns. The patient has a good  understanding of the patient´s diagnosis, condition, and treatment plan as can be expected at this point. The  vital signs have been stable. The patient´s condition is stable and appropriate for discharge from the emergency department.      The patient will pursue further outpatient evaluation with the primary care physician or other designated or consulting physician as outlined in the discharge instructions. They are agreeable to this plan of care and follow-up instructions have been explained in detail. The patient has received these instructions in written format and has expressed an understanding of the discharge instructions. The patient is aware that any significant change in condition or worsening of symptoms should prompt an immediate return to this or the closest emergency department or call to 911.  I have explained discharge medications and the need for follow up with the patient/caretakers. This was also printed in the discharge instructions. Patient was discharged with the following medications and follow up:      Medication List        New Prescriptions      diclofenac 50 MG EC tablet  Commonly known as: VOLTAREN  Take 1 tablet by mouth 2 (Two) Times a Day As Needed (Moderate pain).     predniSONE 50 MG tablet  Commonly known as: DELTASONE  Take 1 tablet by mouth Daily.               Where to Get Your Medications        These medications were sent to [facility]/pharmacy #[id number] - [place], [place] - [address] - [phone] [facility] [phone]  [address] [place] [address]      Hours: 24-hours Phone: [phone]   diclofenac 50 MG EC tablet  predniSONE 50 MG tablet      Chanel Bunch, APRN  [address]  [address]  [place] [address]  [phone]             Final diagnoses:   Right leg pain        ED Disposition       ED Disposition   Discharge    Condition   Stable    Comment   --               This medical record created using voice recognition software.             Raphael España PA-C  10/27/24 3145

## 2024-10-28 ENCOUNTER — HOSPITAL ENCOUNTER (OUTPATIENT)
Dept: GENERAL RADIOLOGY | Facility: HOSPITAL | Age: 79
Discharge: HOME | End: 2024-10-28
Admitting: NURSE PRACTITIONER
Payer: MEDICARE

## 2024-10-28 DIAGNOSIS — R05.3 CHRONIC COUGH: ICD-10-CM

## 2024-10-28 PROCEDURE — 71046 X-RAY EXAM CHEST 2 VIEWS: CPT

## 2024-11-04 ENCOUNTER — OFFICE VISIT (OUTPATIENT)
Dept: FAMILY MEDICINE CLINIC | Facility: CLINIC | Age: 79
End: 2024-11-04
Payer: MEDICARE

## 2024-11-04 VITALS
WEIGHT: 228 LBS | DIASTOLIC BLOOD PRESSURE: 74 MMHG | BODY MASS INDEX: 40.4 KG/M2 | OXYGEN SATURATION: 97 % | HEART RATE: 72 BPM | HEIGHT: 63 IN | SYSTOLIC BLOOD PRESSURE: 139 MMHG

## 2024-11-04 DIAGNOSIS — I10 HYPERTENSION, UNSPECIFIED TYPE: ICD-10-CM

## 2024-11-04 DIAGNOSIS — G89.29 CHRONIC LOW BACK PAIN, UNSPECIFIED BACK PAIN LATERALITY, UNSPECIFIED WHETHER SCIATICA PRESENT: Primary | ICD-10-CM

## 2024-11-04 DIAGNOSIS — M54.50 CHRONIC LOW BACK PAIN, UNSPECIFIED BACK PAIN LATERALITY, UNSPECIFIED WHETHER SCIATICA PRESENT: Primary | ICD-10-CM

## 2024-11-04 PROCEDURE — 1160F RVW MEDS BY RX/DR IN RCRD: CPT | Performed by: NURSE PRACTITIONER

## 2024-11-04 PROCEDURE — 1125F AMNT PAIN NOTED PAIN PRSNT: CPT | Performed by: NURSE PRACTITIONER

## 2024-11-04 PROCEDURE — G2211 COMPLEX E/M VISIT ADD ON: HCPCS | Performed by: NURSE PRACTITIONER

## 2024-11-04 PROCEDURE — 99213 OFFICE O/P EST LOW 20 MIN: CPT | Performed by: NURSE PRACTITIONER

## 2024-11-04 PROCEDURE — 1159F MED LIST DOCD IN RCRD: CPT | Performed by: NURSE PRACTITIONER

## 2024-11-04 PROCEDURE — 3075F SYST BP GE 130 - 139MM HG: CPT | Performed by: NURSE PRACTITIONER

## 2024-11-04 PROCEDURE — 3078F DIAST BP <80 MM HG: CPT | Performed by: NURSE PRACTITIONER

## 2024-11-04 NOTE — PROGRESS NOTES
Chief Complaint  Leg Pain and ER Follow Up    SUBJECTIVE  Leida Pan presents to [facility] to follow up from recent  ER visit on 10/27/24 due to right leg pain/numbness and tingling. DVT ruled out, pt is still having pain/discomfort and numbness, possibly sciatica?  States she did not take the medication prescribed by the ER yet    Patient reports that the pain is not that bad, but numbness and tingling to the lateral thigh and calf as well as a burning sensation in her foot at times.    Patient states she mainly wants to get this looked at because it fit progresses she may have to miss a vacation that she has scheduled and she is trying to avoid that    Patient denies any loss of control of bowel or bladder or any saddle anesthesia.      History of Present Illness  Past Medical History:   Diagnosis Date    Arthritis     Cataract     History of medical problems       Family History   Problem Relation Age of Onset    Cancer Mother     Heart disease Mother     Heart disease Father       Past Surgical History:   Procedure Laterality Date    BARIATRIC SURGERY  2019        Current Outpatient Medications:     atorvastatin (LIPITOR) 10 MG tablet, Take 1 tablet by mouth Daily., Disp: 90 tablet, Rfl: 1    calcium acetate (PHOS BINDER,) 667 MG capsule capsule, Take 2 capsules by mouth 3 (Three) Times a Day., Disp: , Rfl:     Cobalamin Combinations (B-12) 100-5000 MCG sublingual tablet, Place  under the tongue. 1 A WEEK, Disp: , Rfl:     diclofenac (VOLTAREN) 50 MG EC tablet, Take 1 tablet by mouth 2 (Two) Times a Day As Needed (Moderate pain)., Disp: 30 tablet, Rfl: 0    iron polysacch complex-B12-FA (FERREX 150 FORTE) 150-0.025-1 MG capsule capsule, Take  by mouth Daily., Disp: , Rfl:     losartan (Cozaar) 25 MG tablet, Take 1 tablet by mouth Daily., Disp: 90 tablet, Rfl: 0    metFORMIN (GLUCOPHAGE) 500 MG tablet, Take 1 tablet by mouth 2 (Two) Times a Day With Meals., Disp: 180 tablet,  "Rfl: 1    multivitamin with minerals tablet tablet, Take 1 tablet by mouth Daily., Disp: , Rfl:     predniSONE (DELTASONE) 50 MG tablet, Take 1 tablet by mouth Daily., Disp: 5 tablet, Rfl: 0    triamcinolone (KENALOG) 0.1 % ointment, Apply  topically to the appropriate area as directed., Disp: , Rfl:     OBJECTIVE  Vital Signs:   /74   Pulse 72   Ht 160 cm (63\")   Wt 103 kg (228 lb)   SpO2 97%   BMI 40.39 kg/m²    Estimated body mass index is 40.39 kg/m² as calculated from the following:    Height as of this encounter: 160 cm (63\").    Weight as of this encounter: 103 kg (228 lb).     Wt Readings from Last 3 Encounters:   11/04/24 103 kg (228 lb)   10/27/24 103 kg (226 lb 13.7 oz)   10/23/24 103 kg (226 lb)     BP Readings from Last 3 Encounters:   11/04/24 139/74   10/27/24 178/97   10/23/24 130/63       Physical Exam  Vitals reviewed.   Constitutional:       Appearance: Normal appearance. She is well-developed.   HENT:      Head: Normocephalic and atraumatic.      Right Ear: External ear normal.      Left Ear: External ear normal.   Eyes:      Conjunctiva/sclera: Conjunctivae normal.      Pupils: Pupils are equal, round, and reactive to light.   Cardiovascular:      Rate and Rhythm: Normal rate and regular rhythm.      Heart sounds: No murmur heard.     No friction rub. No gallop.   Pulmonary:      Effort: Pulmonary effort is normal.      Breath sounds: Normal breath sounds. No wheezing or rhonchi.   Musculoskeletal:      Lumbar back: No tenderness or bony tenderness. Normal range of motion. Negative right straight leg raise test and negative left straight leg raise test.   Skin:     General: Skin is warm and dry.   Neurological:      Mental Status: She is alert and oriented to person, place, and time.      Cranial Nerves: No cranial nerve deficit.   Psychiatric:         Mood and Affect: Mood and affect normal.         Behavior: Behavior normal.         Thought Content: Thought content normal.         " Judgment: Judgment normal.          Result Review        XR Chest 2 View    Result Date: 10/28/2024  Impression: No acute cardiopulmonary disease. Electronically Signed: Nate Naylor MD  10/28/2024 11:17 AM EDT  Workstation ID: [id number]       The above data has been reviewed by TOI Lemus 11/04/2024 10:06 EST.          Patient Care Team:  Chanel Bunch APRN as PCP - General (Nurse Practitioner)            ASSESSMENT & PLAN    Diagnoses and all orders for this visit:    1. Chronic low back pain, unspecified back pain laterality, unspecified whether sciatica present (Primary)  -     XR Spine Lumbar 2 or 3 View; Future    2. Hypertension, unspecified type  Assessment & Plan:  Patient reports doing well with the losartan thus far, blood pressure is a bit elevated today but improved overall, continue current dose of medication      Patient declines physical therapy at present, states that she will get the prednisone prescribed from the ER filled and take it as directed, she will not take the diclofenac as she does have a history of gastric bypass.  She will follow-up if any new or worsening symptoms or no improvement    Tobacco Use: Low Risk  (11/4/2024)    Patient History     Smoking Tobacco Use: Never     Smokeless Tobacco Use: Never     Passive Exposure: Not on file       Follow Up     Return if symptoms worsen or fail to improve.        Patient was given instructions and counseling regarding her condition or for health maintenance advice. Please see specific information pulled into the AVS if appropriate.   I have reviewed information obtained and documented by others and I have confirmed the accuracy of this documented note.    TOI Lemus

## 2024-11-04 NOTE — ASSESSMENT & PLAN NOTE
Patient reports doing well with the losartan thus far, blood pressure is a bit elevated today but improved overall, continue current dose of medication   (1) More than 48 hours/None

## 2024-11-05 ENCOUNTER — HOSPITAL ENCOUNTER (OUTPATIENT)
Dept: GENERAL RADIOLOGY | Facility: HOSPITAL | Age: 79
Discharge: HOME | End: 2024-11-05
Admitting: NURSE PRACTITIONER
Payer: MEDICARE

## 2024-11-05 DIAGNOSIS — M54.50 CHRONIC LOW BACK PAIN, UNSPECIFIED BACK PAIN LATERALITY, UNSPECIFIED WHETHER SCIATICA PRESENT: ICD-10-CM

## 2024-11-05 DIAGNOSIS — G89.29 CHRONIC LOW BACK PAIN, UNSPECIFIED BACK PAIN LATERALITY, UNSPECIFIED WHETHER SCIATICA PRESENT: ICD-10-CM

## 2024-11-05 PROCEDURE — 72100 X-RAY EXAM L-S SPINE 2/3 VWS: CPT

## 2024-11-11 ENCOUNTER — TELEPHONE (OUTPATIENT)
Dept: FAMILY MEDICINE CLINIC | Facility: CLINIC | Age: 79
End: 2024-11-11
Payer: MEDICARE

## 2024-11-11 DIAGNOSIS — M54.50 CHRONIC LOW BACK PAIN, UNSPECIFIED BACK PAIN LATERALITY, UNSPECIFIED WHETHER SCIATICA PRESENT: Primary | ICD-10-CM

## 2024-11-11 DIAGNOSIS — G89.29 CHRONIC LOW BACK PAIN, UNSPECIFIED BACK PAIN LATERALITY, UNSPECIFIED WHETHER SCIATICA PRESENT: Primary | ICD-10-CM

## 2024-11-11 RX ORDER — HYDROCHLOROTHIAZIDE 12.5 MG/1
12.5 TABLET ORAL DAILY
Qty: 30 TABLET | Refills: 1 | Status: SHIPPED | OUTPATIENT
Start: 2024-11-11

## 2024-11-11 NOTE — TELEPHONE ENCOUNTER
Patient states she has stopped taking her Losartan due to it causing extreme dizziness. Please advise.

## 2024-11-11 NOTE — TELEPHONE ENCOUNTER
Pt aware and voiced understanding. Scheduled for 12/09/24.     Pt also states she would like to go to [facility], pt was given her Xray results today. Order placed.

## 2024-11-11 NOTE — TELEPHONE ENCOUNTER
Noted, we switched to losartan due to patient's chronic cough which she thought may have possibly been contributed to her lisinopril, since stopping the lisinopril has her cough resolved?  If it is the same then it was not the lisinopril and I would recommend we switch back to it

## 2024-11-11 NOTE — TELEPHONE ENCOUNTER
Noted, recommend we trial hydrochlorothiazide, new prescription sent, patient needs to follow-up in 1 month for reevaluation.  Also need to obtain home blood pressure cuff and monitor daily

## 2024-11-11 NOTE — TELEPHONE ENCOUNTER
Confirmed with pt that cough did resolve after stopping Lisinopril. I asked pt if she has a BP machine at home and she does not. Please advise on next step

## 2024-12-09 ENCOUNTER — OFFICE VISIT (OUTPATIENT)
Dept: FAMILY MEDICINE CLINIC | Facility: CLINIC | Age: 79
End: 2024-12-09
Payer: MEDICARE

## 2024-12-09 VITALS
OXYGEN SATURATION: 98 % | HEART RATE: 67 BPM | HEIGHT: 63 IN | WEIGHT: 231 LBS | BODY MASS INDEX: 40.93 KG/M2 | DIASTOLIC BLOOD PRESSURE: 66 MMHG | SYSTOLIC BLOOD PRESSURE: 137 MMHG

## 2024-12-09 DIAGNOSIS — E78.5 HYPERLIPIDEMIA, UNSPECIFIED HYPERLIPIDEMIA TYPE: ICD-10-CM

## 2024-12-09 DIAGNOSIS — Z13.29 THYROID DISORDER SCREEN: ICD-10-CM

## 2024-12-09 DIAGNOSIS — I10 HYPERTENSION, UNSPECIFIED TYPE: Primary | ICD-10-CM

## 2024-12-09 DIAGNOSIS — R73.03 PREDIABETES: ICD-10-CM

## 2024-12-09 PROCEDURE — 1125F AMNT PAIN NOTED PAIN PRSNT: CPT | Performed by: NURSE PRACTITIONER

## 2024-12-09 PROCEDURE — 3078F DIAST BP <80 MM HG: CPT | Performed by: NURSE PRACTITIONER

## 2024-12-09 PROCEDURE — 1160F RVW MEDS BY RX/DR IN RCRD: CPT | Performed by: NURSE PRACTITIONER

## 2024-12-09 PROCEDURE — G2211 COMPLEX E/M VISIT ADD ON: HCPCS | Performed by: NURSE PRACTITIONER

## 2024-12-09 PROCEDURE — 1159F MED LIST DOCD IN RCRD: CPT | Performed by: NURSE PRACTITIONER

## 2024-12-09 PROCEDURE — 3075F SYST BP GE 130 - 139MM HG: CPT | Performed by: NURSE PRACTITIONER

## 2024-12-09 PROCEDURE — 99213 OFFICE O/P EST LOW 20 MIN: CPT | Performed by: NURSE PRACTITIONER

## 2024-12-09 RX ORDER — HYDROCHLOROTHIAZIDE 12.5 MG/1
12.5 TABLET ORAL DAILY
Qty: 90 TABLET | Refills: 1 | Status: SHIPPED | OUTPATIENT
Start: 2024-12-09

## 2024-12-09 NOTE — ASSESSMENT & PLAN NOTE
Blood pressure in excellent control on home readings, fairly well-controlled in office, tolerating hydrochlorothiazide well, we will continue current medication

## 2024-12-09 NOTE — PROGRESS NOTES
"Chief Complaint  Hypertension    SUBJECTIVE  Leida Pan presents to [facility] for one month follow up on HTN. At last visit pt was switched from Lisinopril to Losartan due to a cough. Cough went away but pt started having dizzy spells. Pt called the office and was switched to HCTZ 12.5. Pt states the dizziness is gone and is feeling good.     121/77   116/73   129/75   137/73   111/71      History of Present Illness  Past Medical History:   Diagnosis Date    Arthritis     Cataract     History of medical problems       Family History   Problem Relation Age of Onset    Cancer Mother     Heart disease Mother     Heart disease Father       Past Surgical History:   Procedure Laterality Date    BARIATRIC SURGERY  2019        Current Outpatient Medications:     atorvastatin (LIPITOR) 10 MG tablet, Take 1 tablet by mouth Daily., Disp: 90 tablet, Rfl: 1    calcium acetate (PHOS BINDER,) 667 MG capsule capsule, Take 2 capsules by mouth 3 (Three) Times a Day., Disp: , Rfl:     Cobalamin Combinations (B-12) 100-5000 MCG sublingual tablet, Place  under the tongue. 1 A WEEK, Disp: , Rfl:     diclofenac (VOLTAREN) 50 MG EC tablet, Take 1 tablet by mouth 2 (Two) Times a Day As Needed (Moderate pain)., Disp: 30 tablet, Rfl: 0    hydroCHLOROthiazide 12.5 MG tablet, Take 1 tablet by mouth Daily., Disp: 90 tablet, Rfl: 1    iron polysacch complex-B12-FA (FERREX 150 FORTE) 150-0.025-1 MG capsule capsule, Take  by mouth Daily., Disp: , Rfl:     metFORMIN (GLUCOPHAGE) 500 MG tablet, Take 1 tablet by mouth 2 (Two) Times a Day With Meals., Disp: 180 tablet, Rfl: 1    multivitamin with minerals tablet tablet, Take 1 tablet by mouth Daily., Disp: , Rfl:     triamcinolone (KENALOG) 0.1 % ointment, Apply  topically to the appropriate area as directed., Disp: , Rfl:     OBJECTIVE  Vital Signs:   /66   Pulse 67   Ht 160 cm (63\")   Wt 105 kg (231 lb)   SpO2 98%   BMI 40.92 kg/m²    Estimated body mass " "index is 40.92 kg/m² as calculated from the following:    Height as of this encounter: 160 cm (63\").    Weight as of this encounter: 105 kg (231 lb).     Wt Readings from Last 3 Encounters:   12/09/24 105 kg (231 lb)   11/04/24 103 kg (228 lb)   10/27/24 103 kg (226 lb 13.7 oz)     BP Readings from Last 3 Encounters:   12/09/24 137/66   11/04/24 139/74   10/27/24 178/97       Physical Exam  Vitals reviewed.   Constitutional:       Appearance: Normal appearance. She is well-developed.   HENT:      Head: Normocephalic and atraumatic.      Right Ear: External ear normal.      Left Ear: External ear normal.   Eyes:      Conjunctiva/sclera: Conjunctivae normal.      Pupils: Pupils are equal, round, and reactive to light.   Cardiovascular:      Rate and Rhythm: Normal rate and regular rhythm.      Heart sounds: No murmur heard.     No friction rub. No gallop.   Pulmonary:      Effort: Pulmonary effort is normal.      Breath sounds: Normal breath sounds. No wheezing or rhonchi.   Skin:     General: Skin is warm and dry.   Neurological:      Mental Status: She is alert and oriented to person, place, and time.      Cranial Nerves: No cranial nerve deficit.   Psychiatric:         Mood and Affect: Mood and affect normal.         Behavior: Behavior normal.         Thought Content: Thought content normal.         Judgment: Judgment normal.          Result Review    CMP          7/8/2024    09:58   CMP   Glucose 94    BUN 24    Creatinine 0.77    EGFR 80.1    Sodium 141    Potassium 4.2    Chloride 104    Calcium 9.8    Total Protein 7.2    Albumin 4.1    Globulin 3.1    Total Bilirubin 0.4    Alkaline Phosphatase 66    AST (SGOT) 19    ALT (SGPT) 17    Albumin/Globulin Ratio 1.3    BUN/Creatinine Ratio 31.2    Anion Gap 10.0      CBC          7/8/2024    09:58   CBC   WBC 9.02    RBC 4.84    Hemoglobin 14.4    Hematocrit 43.7    MCV 90.3    MCH 29.8    MCHC 33.0    RDW 13.6    Platelets 271      Lipid Panel          7/8/2024    " 09:58   Lipid Panel   Total Cholesterol 162    Triglycerides 125    HDL Cholesterol 52    VLDL Cholesterol 22    LDL Cholesterol  88    LDL/HDL Ratio 1.63      TSH          7/8/2024    09:58   TSH   TSH 3.190      Electrolytes          7/8/2024    09:58   Electrolytes   Sodium 141    Potassium 4.2    Chloride 104    Calcium 9.8      Most Recent A1C          7/8/2024    09:58   HGBA1C Most Recent   Hemoglobin A1C 5.70          XR Spine Lumbar 2 or 3 View    Result Date: 11/7/2024  Impression: Advanced degenerative change. No acute osseous abnormalities are identified. Electronically Signed: Juan Ramon Keating MD  11/7/2024 3:54 PM EST  Workstation ID: [id number]    XR Chest 2 View    Result Date: 10/28/2024  Impression: No acute cardiopulmonary disease. Electronically Signed: Nate Naylor MD  10/28/2024 11:17 AM EDT  Workstation ID: [id number]       The above data has been reviewed by TOI Lemus 12/09/2024 08:47 EST.          Patient Care Team:  Chanel Bunch APRN as PCP - General (Nurse Practitioner)            ASSESSMENT & PLAN    Diagnoses and all orders for this visit:    1. Hypertension, unspecified type (Primary)  Assessment & Plan:  Blood pressure in excellent control on home readings, fairly well-controlled in office, tolerating hydrochlorothiazide well, we will continue current medication    Orders:  -     hydroCHLOROthiazide 12.5 MG tablet; Take 1 tablet by mouth Daily.  Dispense: 90 tablet; Refill: 1  -     Comprehensive Metabolic Panel; Future  -     CBC & Differential; Future  -     Lipid Panel; Future    2. Hyperlipidemia, unspecified hyperlipidemia type  Overview:  Stable on atorvastatin, continue current medication    Orders:  -     Lipid Panel; Future    3. Thyroid disorder screen  -     TSH Rfx On Abnormal To Free T4; Future    4. Prediabetes  -     Hemoglobin A1c; Future         Tobacco Use: Low Risk  (12/9/2024)    Patient History     Smoking Tobacco Use: Never     Smokeless Tobacco  Use: Never     Passive Exposure: Not on file       Follow Up     Return in about 3 months (around 3/9/2025), or if symptoms worsen or fail to improve.        Patient was given instructions and counseling regarding her condition or for health maintenance advice. Please see specific information pulled into the AVS if appropriate.   I have reviewed information obtained and documented by others and I have confirmed the accuracy of this documented note.    Chanel Bunch APRN

## 2025-01-19 DIAGNOSIS — I10 HYPERTENSION, UNSPECIFIED TYPE: ICD-10-CM

## 2025-01-20 RX ORDER — LOSARTAN POTASSIUM 25 MG/1
25 TABLET ORAL DAILY
Qty: 90 TABLET | Refills: 0 | OUTPATIENT
Start: 2025-01-20

## 2025-01-20 NOTE — TELEPHONE ENCOUNTER
pt was switched from Lisinopril to Losartan due to a cough. Cough went away but pt started having dizzy spells. Pt called the office and was switched to HCTZ 12.5. Pt states the dizziness is gone and is feeling good.

## 2025-02-04 ENCOUNTER — HOSPITAL ENCOUNTER (OUTPATIENT)
Dept: GENERAL RADIOLOGY | Facility: HOSPITAL | Age: 80
Discharge: HOME | End: 2025-02-04
Admitting: NURSE PRACTITIONER
Payer: MEDICARE

## 2025-02-04 ENCOUNTER — OFFICE VISIT (OUTPATIENT)
Dept: FAMILY MEDICINE CLINIC | Facility: CLINIC | Age: 80
End: 2025-02-04
Payer: MEDICARE

## 2025-02-04 VITALS
HEART RATE: 76 BPM | HEIGHT: 63 IN | DIASTOLIC BLOOD PRESSURE: 64 MMHG | OXYGEN SATURATION: 96 % | WEIGHT: 227 LBS | BODY MASS INDEX: 40.22 KG/M2 | SYSTOLIC BLOOD PRESSURE: 145 MMHG

## 2025-02-04 DIAGNOSIS — R05.9 COUGH, UNSPECIFIED TYPE: ICD-10-CM

## 2025-02-04 DIAGNOSIS — R05.9 COUGH, UNSPECIFIED TYPE: Primary | ICD-10-CM

## 2025-02-04 DIAGNOSIS — R73.03 PREDIABETES: ICD-10-CM

## 2025-02-04 DIAGNOSIS — I10 HYPERTENSION, UNSPECIFIED TYPE: ICD-10-CM

## 2025-02-04 DIAGNOSIS — J40 BRONCHITIS: ICD-10-CM

## 2025-02-04 LAB
EXPIRATION DATE: NORMAL
FLUAV AG UPPER RESP QL IA.RAPID: NOT DETECTED
FLUBV AG UPPER RESP QL IA.RAPID: NOT DETECTED
INTERNAL CONTROL: NORMAL
Lab: NORMAL
SARS-COV-2 AG UPPER RESP QL IA.RAPID: NOT DETECTED

## 2025-02-04 PROCEDURE — 3077F SYST BP >= 140 MM HG: CPT | Performed by: NURSE PRACTITIONER

## 2025-02-04 PROCEDURE — 1159F MED LIST DOCD IN RCRD: CPT | Performed by: NURSE PRACTITIONER

## 2025-02-04 PROCEDURE — 87428 SARSCOV & INF VIR A&B AG IA: CPT | Performed by: NURSE PRACTITIONER

## 2025-02-04 PROCEDURE — 71046 X-RAY EXAM CHEST 2 VIEWS: CPT

## 2025-02-04 PROCEDURE — 99214 OFFICE O/P EST MOD 30 MIN: CPT | Performed by: NURSE PRACTITIONER

## 2025-02-04 PROCEDURE — 1125F AMNT PAIN NOTED PAIN PRSNT: CPT | Performed by: NURSE PRACTITIONER

## 2025-02-04 PROCEDURE — 1160F RVW MEDS BY RX/DR IN RCRD: CPT | Performed by: NURSE PRACTITIONER

## 2025-02-04 PROCEDURE — 3078F DIAST BP <80 MM HG: CPT | Performed by: NURSE PRACTITIONER

## 2025-02-04 RX ORDER — METHYLPREDNISOLONE 4 MG/1
TABLET ORAL
Qty: 1 EACH | Refills: 0 | Status: SHIPPED | OUTPATIENT
Start: 2025-02-04

## 2025-02-04 RX ORDER — FLUCONAZOLE 150 MG/1
150 TABLET ORAL ONCE
Qty: 2 TABLET | Refills: 0 | Status: SHIPPED | OUTPATIENT
Start: 2025-02-04 | End: 2025-02-04

## 2025-02-04 RX ORDER — ALBUTEROL SULFATE 90 UG/1
2 INHALANT RESPIRATORY (INHALATION) EVERY 4 HOURS PRN
Qty: 8 G | Refills: 0 | Status: SHIPPED | OUTPATIENT
Start: 2025-02-04

## 2025-02-04 NOTE — PROGRESS NOTES
Chief Complaint  Cough, Nasal Congestion, and URI    SUBJECTIVE  Leida Pan presents to [facility] due to cough, congestion, and runny nose for one week. Pt states she feels like its all in her chest.     History of Present Illness  Past Medical History:   Diagnosis Date    Arthritis     Cataract     History of medical problems       Family History   Problem Relation Age of Onset    Cancer Mother     Heart disease Mother     Heart disease Father       Past Surgical History:   Procedure Laterality Date    BARIATRIC SURGERY  2019        Current Outpatient Medications:     atorvastatin (LIPITOR) 10 MG tablet, Take 1 tablet by mouth Daily., Disp: 90 tablet, Rfl: 1    calcium acetate (PHOS BINDER,) 667 MG capsule capsule, Take 2 capsules by mouth 3 (Three) Times a Day., Disp: , Rfl:     Cobalamin Combinations (B-12) 100-5000 MCG sublingual tablet, Place  under the tongue. 1 A WEEK, Disp: , Rfl:     hydroCHLOROthiazide 12.5 MG tablet, Take 1 tablet by mouth Daily., Disp: 90 tablet, Rfl: 1    iron polysacch complex-B12-FA (FERREX 150 FORTE) 150-0.025-1 MG capsule capsule, Take  by mouth Daily., Disp: , Rfl:     metFORMIN (GLUCOPHAGE) 500 MG tablet, Take 1 tablet by mouth 2 (Two) Times a Day With Meals., Disp: 180 tablet, Rfl: 1    multivitamin with minerals tablet tablet, Take 1 tablet by mouth Daily., Disp: , Rfl:     triamcinolone (KENALOG) 0.1 % ointment, Apply  topically to the appropriate area as directed., Disp: , Rfl:     albuterol sulfate  (90 Base) MCG/ACT inhaler, Inhale 2 puffs Every 4 (Four) Hours As Needed for Wheezing or Shortness of Air., Disp: 8 g, Rfl: 0    amoxicillin-clavulanate (AUGMENTIN) 875-125 MG per tablet, Take 1 tablet by mouth 2 (Two) Times a Day., Disp: 20 tablet, Rfl: 0    diclofenac (VOLTAREN) 50 MG EC tablet, Take 1 tablet by mouth 2 (Two) Times a Day As Needed (Moderate pain). (Patient not taking: Reported on 2/4/2025), Disp: 30 tablet, Rfl: 0     "fluconazole (Diflucan) 150 MG tablet, Take 1 tablet by mouth 1 (One) Time for 1 dose. One tablet PO now, repeat one tab PO in 72 hours, Disp: 2 tablet, Rfl: 0    methylPREDNISolone (MEDROL) 4 MG dose pack, Take as directed on package instructions., Disp: 1 each, Rfl: 0    OBJECTIVE  Vital Signs:   /64   Pulse 76   Ht 160 cm (63\")   Wt 103 kg (227 lb)   SpO2 96%   BMI 40.21 kg/m²    Estimated body mass index is 40.21 kg/m² as calculated from the following:    Height as of this encounter: 160 cm (63\").    Weight as of this encounter: 103 kg (227 lb).     Wt Readings from Last 3 Encounters:   02/04/25 103 kg (227 lb)   12/09/24 105 kg (231 lb)   11/04/24 103 kg (228 lb)     BP Readings from Last 3 Encounters:   02/04/25 145/64   12/09/24 137/66   11/04/24 139/74       Physical Exam  Vitals reviewed.   Constitutional:       Appearance: Normal appearance. She is well-developed.   HENT:      Head: Normocephalic and atraumatic.      Right Ear: Tympanic membrane, ear canal and external ear normal.      Left Ear: Tympanic membrane, ear canal and external ear normal.      Nose: Congestion and rhinorrhea present.   Eyes:      Conjunctiva/sclera: Conjunctivae normal.      Pupils: Pupils are equal, round, and reactive to light.   Cardiovascular:      Rate and Rhythm: Normal rate and regular rhythm.      Heart sounds: No murmur heard.     No friction rub. No gallop.   Pulmonary:      Effort: Pulmonary effort is normal.      Breath sounds: Wheezing and rhonchi present.   Skin:     General: Skin is warm and dry.   Neurological:      Mental Status: She is alert and oriented to person, place, and time.      Cranial Nerves: No cranial nerve deficit.   Psychiatric:         Mood and Affect: Mood and affect normal.         Behavior: Behavior normal.         Thought Content: Thought content normal.         Judgment: Judgment normal.          Result Review    [facility]          7/8/2024    09:58   CMP   Glucose 94    BUN 24  "   Creatinine 0.77    EGFR 80.1    Sodium 141    Potassium 4.2    Chloride 104    Calcium 9.8    Total Protein 7.2    Albumin 4.1    Globulin 3.1    Total Bilirubin 0.4    Alkaline Phosphatase 66    AST (SGOT) 19    ALT (SGPT) 17    Albumin/Globulin Ratio 1.3    BUN/Creatinine Ratio 31.2    Anion Gap 10.0      CBC          7/8/2024    09:58   CBC   WBC 9.02    RBC 4.84    Hemoglobin 14.4    Hematocrit 43.7    MCV 90.3    MCH 29.8    MCHC 33.0    RDW 13.6    Platelets 271        XR Spine Lumbar 2 or 3 View    Result Date: 11/7/2024  Impression: Advanced degenerative change. No acute osseous abnormalities are identified. Electronically Signed: Juan Ramon Keating MD  11/7/2024 3:54 PM EST  Workstation ID: [id number]    XR Chest 2 View    Result Date: 10/28/2024  Impression: No acute cardiopulmonary disease. Electronically Signed: Nate Naylor MD  10/28/2024 11:17 AM EDT  Workstation ID: [id number]       The above data has been reviewed by TOI Lemus 02/04/2025 09:56 EST.          Patient Care Team:  Chanel Bunch APRN as PCP - General (Nurse Practitioner)            ASSESSMENT & PLAN    Diagnoses and all orders for this visit:    1. Cough, unspecified type (Primary)  -     POCT SARS-CoV-2 Antigen DILSHAD + Flu  -     XR Chest 2 View; Future    2. Bronchitis  Comments:  Chest x-ray to rule out pneumonia  Orders:  -     amoxicillin-clavulanate (AUGMENTIN) 875-125 MG per tablet; Take 1 tablet by mouth 2 (Two) Times a Day.  Dispense: 20 tablet; Refill: 0  -     fluconazole (Diflucan) 150 MG tablet; Take 1 tablet by mouth 1 (One) Time for 1 dose. One tablet PO now, repeat one tab PO in 72 hours  Dispense: 2 tablet; Refill: 0  -     methylPREDNISolone (MEDROL) 4 MG dose pack; Take as directed on package instructions.  Dispense: 1 each; Refill: 0  -     albuterol sulfate  (90 Base) MCG/ACT inhaler; Inhale 2 puffs Every 4 (Four) Hours As Needed for Wheezing or Shortness of Air.  Dispense: 8 g; Refill: 0    3.  Hypertension, unspecified type  Assessment & Plan:  Blood pressure a bit elevated at present, monitor, follow-up if remains elevated      4. Prediabetes  Assessment & Plan:  Monitor blood glucose while taking methylprednisone           Tobacco Use: Low Risk  (2/4/2025)    Patient History     Smoking Tobacco Use: Never     Smokeless Tobacco Use: Never     Passive Exposure: Not on file       Follow Up     Return if symptoms worsen or fail to improve.        Patient was given instructions and counseling regarding her condition or for health maintenance advice. Please see specific information pulled into the AVS if appropriate.   I have reviewed information obtained and documented by others and I have confirmed the accuracy of this documented note.    Chanel Bunch, APRN

## 2025-02-05 ENCOUNTER — TELEPHONE (OUTPATIENT)
Dept: FAMILY MEDICINE CLINIC | Facility: CLINIC | Age: 80
End: 2025-02-05
Payer: MEDICARE

## 2025-02-05 NOTE — TELEPHONE ENCOUNTER
Caller: Leida Pan    Relationship: Self    Best call back number: [phone]    What test was performed: XRAY ON CHEST    When was the test performed: 02/04/2025    Additional notes: PATIENT IS ASKING FOR XRAY RESULTS.

## 2025-02-24 ENCOUNTER — TELEPHONE (OUTPATIENT)
Dept: FAMILY MEDICINE CLINIC | Facility: CLINIC | Age: 80
End: 2025-02-24
Payer: MEDICARE

## 2025-02-24 NOTE — TELEPHONE ENCOUNTER
Spoke with patient regarding overdue orders.    CBC & Differential  Comprehensive Metabolic Panel  Hemoglobin A1c  Lipid Panel  TSH Rfx On Abnormal To Free T4

## 2025-03-13 ENCOUNTER — LAB (OUTPATIENT)
Facility: HOSPITAL | Age: 80
End: 2025-03-13
Payer: MEDICARE

## 2025-03-13 DIAGNOSIS — Z13.29 THYROID DISORDER SCREEN: ICD-10-CM

## 2025-03-13 DIAGNOSIS — I10 HYPERTENSION, UNSPECIFIED TYPE: ICD-10-CM

## 2025-03-13 DIAGNOSIS — R73.03 PREDIABETES: ICD-10-CM

## 2025-03-13 DIAGNOSIS — E78.5 HYPERLIPIDEMIA, UNSPECIFIED HYPERLIPIDEMIA TYPE: ICD-10-CM

## 2025-03-13 LAB
ALBUMIN SERPL-MCNC: 4 G/DL (ref 3.5–5.2)
ALBUMIN/GLOB SERPL: 1.3 G/DL
ALP SERPL-CCNC: 60 U/L (ref 39–117)
ALT SERPL W P-5'-P-CCNC: 24 U/L (ref 1–33)
ANION GAP SERPL CALCULATED.3IONS-SCNC: 11.1 MMOL/L (ref 5–15)
AST SERPL-CCNC: 24 U/L (ref 1–32)
BASOPHILS # BLD AUTO: 0.05 10*3/MM3 (ref 0–0.2)
BASOPHILS NFR BLD AUTO: 0.6 % (ref 0–1.5)
BILIRUB SERPL-MCNC: 0.3 MG/DL (ref 0–1.2)
BUN SERPL-MCNC: 25 MG/DL (ref 8–23)
BUN/CREAT SERPL: 32.9 (ref 7–25)
CALCIUM SPEC-SCNC: 9.6 MG/DL (ref 8.6–10.5)
CHLORIDE SERPL-SCNC: 106 MMOL/L (ref 98–107)
CHOLEST SERPL-MCNC: 159 MG/DL (ref 0–200)
CO2 SERPL-SCNC: 25.9 MMOL/L (ref 22–29)
CREAT SERPL-MCNC: 0.76 MG/DL (ref 0.57–1)
DEPRECATED RDW RBC AUTO: 45.4 FL (ref 37–54)
EGFRCR SERPLBLD CKD-EPI 2021: 80.8 ML/MIN/1.73
EOSINOPHIL # BLD AUTO: 0.25 10*3/MM3 (ref 0–0.4)
EOSINOPHIL NFR BLD AUTO: 2.9 % (ref 0.3–6.2)
ERYTHROCYTE [DISTWIDTH] IN BLOOD BY AUTOMATED COUNT: 13.4 % (ref 12.3–15.4)
GLOBULIN UR ELPH-MCNC: 3.2 GM/DL
GLUCOSE SERPL-MCNC: 102 MG/DL (ref 65–99)
HBA1C MFR BLD: 6.2 % (ref 4.8–5.6)
HCT VFR BLD AUTO: 46.9 % (ref 34–46.6)
HDLC SERPL-MCNC: 47 MG/DL (ref 40–60)
HGB BLD-MCNC: 15 G/DL (ref 12–15.9)
IMM GRANULOCYTES # BLD AUTO: 0.01 10*3/MM3 (ref 0–0.05)
IMM GRANULOCYTES NFR BLD AUTO: 0.1 % (ref 0–0.5)
LDLC SERPL CALC-MCNC: 84 MG/DL (ref 0–100)
LDLC/HDLC SERPL: 1.7 {RATIO}
LYMPHOCYTES # BLD AUTO: 3.88 10*3/MM3 (ref 0.7–3.1)
LYMPHOCYTES NFR BLD AUTO: 45.5 % (ref 19.6–45.3)
MCH RBC QN AUTO: 29.2 PG (ref 26.6–33)
MCHC RBC AUTO-ENTMCNC: 32 G/DL (ref 31.5–35.7)
MCV RBC AUTO: 91.2 FL (ref 79–97)
MONOCYTES # BLD AUTO: 0.55 10*3/MM3 (ref 0.1–0.9)
MONOCYTES NFR BLD AUTO: 6.4 % (ref 5–12)
NEUTROPHILS NFR BLD AUTO: 3.79 10*3/MM3 (ref 1.7–7)
NEUTROPHILS NFR BLD AUTO: 44.5 % (ref 42.7–76)
NRBC BLD AUTO-RTO: 0 /100 WBC (ref 0–0.2)
PLATELET # BLD AUTO: 289 10*3/MM3 (ref 140–450)
PMV BLD AUTO: 9.7 FL (ref 6–12)
POTASSIUM SERPL-SCNC: 3.9 MMOL/L (ref 3.5–5.2)
PROT SERPL-MCNC: 7.2 G/DL (ref 6–8.5)
RBC # BLD AUTO: 5.14 10*6/MM3 (ref 3.77–5.28)
SODIUM SERPL-SCNC: 143 MMOL/L (ref 136–145)
T4 FREE SERPL-MCNC: 0.81 NG/DL (ref 0.92–1.68)
TRIGL SERPL-MCNC: 161 MG/DL (ref 0–150)
TSH SERPL DL<=0.05 MIU/L-ACNC: 5.23 UIU/ML (ref 0.27–4.2)
VLDLC SERPL-MCNC: 28 MG/DL (ref 5–40)
WBC NRBC COR # BLD AUTO: 8.53 10*3/MM3 (ref 3.4–10.8)

## 2025-03-13 PROCEDURE — 36415 COLL VENOUS BLD VENIPUNCTURE: CPT

## 2025-03-13 PROCEDURE — 80053 COMPREHEN METABOLIC PANEL: CPT

## 2025-03-13 PROCEDURE — 84443 ASSAY THYROID STIM HORMONE: CPT

## 2025-03-13 PROCEDURE — 85025 COMPLETE CBC W/AUTO DIFF WBC: CPT

## 2025-03-13 PROCEDURE — 84439 ASSAY OF FREE THYROXINE: CPT

## 2025-03-13 PROCEDURE — 80061 LIPID PANEL: CPT

## 2025-03-13 PROCEDURE — 83036 HEMOGLOBIN GLYCOSYLATED A1C: CPT

## 2025-03-17 ENCOUNTER — RESULTS FOLLOW-UP (OUTPATIENT)
Facility: HOSPITAL | Age: 80
End: 2025-03-17
Payer: MEDICARE

## 2025-03-17 DIAGNOSIS — E03.9 HYPOTHYROIDISM, UNSPECIFIED TYPE: Primary | ICD-10-CM

## 2025-03-17 RX ORDER — LEVOTHYROXINE SODIUM 25 UG/1
25 TABLET ORAL
Qty: 30 TABLET | Refills: 1 | Status: SHIPPED | OUTPATIENT
Start: 2025-03-17

## 2025-04-01 ENCOUNTER — OFFICE VISIT (OUTPATIENT)
Dept: FAMILY MEDICINE CLINIC | Facility: CLINIC | Age: 80
End: 2025-04-01
Payer: MEDICARE

## 2025-04-01 VITALS
SYSTOLIC BLOOD PRESSURE: 136 MMHG | OXYGEN SATURATION: 97 % | HEIGHT: 63 IN | DIASTOLIC BLOOD PRESSURE: 66 MMHG | WEIGHT: 228 LBS | HEART RATE: 74 BPM | BODY MASS INDEX: 40.4 KG/M2

## 2025-04-01 DIAGNOSIS — G89.29 CHRONIC LOW BACK PAIN, UNSPECIFIED BACK PAIN LATERALITY, UNSPECIFIED WHETHER SCIATICA PRESENT: ICD-10-CM

## 2025-04-01 DIAGNOSIS — R20.0 NUMBNESS AND TINGLING OF RIGHT LOWER EXTREMITY: ICD-10-CM

## 2025-04-01 DIAGNOSIS — R20.2 NUMBNESS AND TINGLING OF RIGHT LOWER EXTREMITY: ICD-10-CM

## 2025-04-01 DIAGNOSIS — M54.50 CHRONIC LOW BACK PAIN, UNSPECIFIED BACK PAIN LATERALITY, UNSPECIFIED WHETHER SCIATICA PRESENT: ICD-10-CM

## 2025-04-01 DIAGNOSIS — M25.552 LEFT HIP PAIN: ICD-10-CM

## 2025-04-01 DIAGNOSIS — I10 HYPERTENSION, UNSPECIFIED TYPE: Primary | ICD-10-CM

## 2025-04-01 NOTE — PROGRESS NOTES
Chief Complaint  Hypertension    SUBJECTIVE  Leida Pan presents to [facility] for three month follow up on HTN. Pt doing well with HCTZ 12.5 mg.     Pt states she has completed PT for her back, but she is still having the numbness and tingling sensation in the right leg. States PT made her feel much stronger, but did not help with the numbness/tingling/burning sensation. States sometimes can feel in her foot as well.     States the burning/numbness in her right side calf is pretty much constant, does not feel the sensation in her thigh unless she is sitting on something hard, the toilet for example     Denies any weakness, tripping, ect, no loss control bowel or bladder, no saddle anaesthesia     History of Present Illness  Past Medical History:   Diagnosis Date    Arthritis     Cataract     History of medical problems       Family History   Problem Relation Age of Onset    Cancer Mother     Heart disease Mother     Heart disease Father       Past Surgical History:   Procedure Laterality Date    BARIATRIC SURGERY  2019        Current Outpatient Medications:     albuterol sulfate  (90 Base) MCG/ACT inhaler, Inhale 2 puffs Every 4 (Four) Hours As Needed for Wheezing or Shortness of Air., Disp: 8 g, Rfl: 0    atorvastatin (LIPITOR) 10 MG tablet, Take 1 tablet by mouth Daily., Disp: 90 tablet, Rfl: 1    calcium acetate (PHOS BINDER,) 667 MG capsule capsule, Take 2 capsules by mouth 3 (Three) Times a Day., Disp: , Rfl:     Cobalamin Combinations (B-12) 100-5000 MCG sublingual tablet, Place  under the tongue. 1 A WEEK, Disp: , Rfl:     hydroCHLOROthiazide 12.5 MG tablet, Take 1 tablet by mouth Daily., Disp: 90 tablet, Rfl: 1    iron polysacch complex-B12-FA (FERREX 150 FORTE) 150-0.025-1 MG capsule capsule, Take  by mouth Daily., Disp: , Rfl:     levothyroxine (SYNTHROID, LEVOTHROID) 25 MCG tablet, Take 1 tablet by mouth Every Morning., Disp: 30 tablet, Rfl: 1    metFORMIN  "(GLUCOPHAGE) 500 MG tablet, Take 1 tablet by mouth 2 (Two) Times a Day With Meals., Disp: 180 tablet, Rfl: 1    multivitamin with minerals tablet tablet, Take 1 tablet by mouth Daily., Disp: , Rfl:     triamcinolone (KENALOG) 0.1 % ointment, Apply  topically to the appropriate area as directed., Disp: , Rfl:     OBJECTIVE  Vital Signs:   /66   Pulse 74   Ht 160 cm (63\")   Wt 103 kg (228 lb)   SpO2 97%   BMI 40.39 kg/m²    Estimated body mass index is 40.39 kg/m² as calculated from the following:    Height as of this encounter: 160 cm (63\").    Weight as of this encounter: 103 kg (228 lb).     Wt Readings from Last 3 Encounters:   04/01/25 103 kg (228 lb)   02/04/25 103 kg (227 lb)   12/09/24 105 kg (231 lb)     BP Readings from Last 3 Encounters:   04/01/25 136/66   02/04/25 145/64   12/09/24 137/66       Physical Exam  Vitals reviewed.   Constitutional:       Appearance: Normal appearance. She is well-developed.   HENT:      Head: Normocephalic and atraumatic.      Right Ear: External ear normal.      Left Ear: External ear normal.   Eyes:      Conjunctiva/sclera: Conjunctivae normal.      Pupils: Pupils are equal, round, and reactive to light.   Cardiovascular:      Rate and Rhythm: Normal rate and regular rhythm.      Heart sounds: No murmur heard.     No friction rub. No gallop.   Pulmonary:      Effort: Pulmonary effort is normal.      Breath sounds: Normal breath sounds. No wheezing or rhonchi.   Musculoskeletal:      Left hip: No tenderness or bony tenderness. Normal range of motion.   Skin:     General: Skin is warm and dry.   Neurological:      Mental Status: She is alert and oriented to person, place, and time.      Cranial Nerves: No cranial nerve deficit.   Psychiatric:         Mood and Affect: Mood and affect normal.         Behavior: Behavior normal.         Thought Content: Thought content normal.         Judgment: Judgment normal.          Result Review    [facility] [address]          7/8/2024    09:58 " 3/13/2025    07:51   CMP   Glucose 94  102    BUN 24  25    Creatinine 0.77  0.76    EGFR 80.1  80.8    Sodium 141  143    Potassium 4.2  3.9    Chloride 104  106    Calcium 9.8  9.6    Total Protein 7.2  7.2    Albumin 4.1  4.0    Globulin 3.1  3.2    Total Bilirubin 0.4  0.3    Alkaline Phosphatase 66  60    AST (SGOT) 19  24    ALT (SGPT) 17  24    Albumin/Globulin Ratio 1.3  1.3    BUN/Creatinine Ratio 31.2  32.9    Anion Gap 10.0  11.1      CBC          7/8/2024    09:58 3/13/2025    07:51   CBC   WBC 9.02  8.53    RBC 4.84  5.14    Hemoglobin 14.4  15.0    Hematocrit 43.7  46.9    MCV 90.3  91.2    MCH 29.8  29.2    MCHC 33.0  32.0    RDW 13.6  13.4    Platelets 271  289      CBC w/diff          7/8/2024    09:58 3/13/2025    07:51   CBC w/Diff   WBC 9.02  8.53    RBC 4.84  5.14    Hemoglobin 14.4  15.0    Hematocrit 43.7  46.9    MCV 90.3  91.2    MCH 29.8  29.2    MCHC 33.0  32.0    RDW 13.6  13.4    Platelets 271  289    Neutrophil Rel % 51.0  44.5    Immature Granulocyte Rel % 0.1  0.1    Lymphocyte Rel % 39.8  45.5    Monocyte Rel % 5.8  6.4    Eosinophil Rel % 2.9  2.9    Basophil Rel % 0.4  0.6      TSH          7/8/2024    09:58 3/13/2025    07:51   TSH   TSH 3.190  5.230      Most Recent A1C          3/13/2025    07:51   HGBA1C Most Recent   Hemoglobin A1C 6.20        A1C Last 3 Results          7/8/2024    09:58 3/13/2025    07:51   HGBA1C Last 3 Results   Hemoglobin A1C 5.70  6.20        XR Chest 2 View  Result Date: 2/5/2025  No active disease. Electronically Signed: Hubert Sequeira MD  2/5/2025 10:34 PM EST  Workstation ID: [id number]       The above data has been reviewed by TOI Lemus 04/01/2025 09:09 EDT.          Patient Care Team:  Chanel Bunch APRN as PCP - General (Nurse Practitioner)            ASSESSMENT & PLAN    Diagnoses and all orders for this visit:    1. Hypertension, unspecified type (Primary)  Assessment & Plan:  Blood pressure much improved with addition of  hydrochlorothiazide, continue current medication      2. Chronic low back pain, unspecified back pain laterality, unspecified whether sciatica present  -     MRI Lumbar Spine Without Contrast; Future    3. Numbness and tingling of right lower extremity  -     MRI Lumbar Spine Without Contrast; Future    4. Left hip pain  -     XR Hip With or Without Pelvis 2 - 3 View Left; Future         Tobacco Use: Low Risk  (4/1/2025)    Patient History     Smoking Tobacco Use: Never     Smokeless Tobacco Use: Never     Passive Exposure: Not on file       Follow Up     Return in 3 months (on 7/1/2025), or if symptoms worsen or fail to improve.        Patient was given instructions and counseling regarding her condition or for health maintenance advice. Please see specific information pulled into the AVS if appropriate.   I have reviewed information obtained and documented by others and I have confirmed the accuracy of this documented note.    TOI Lemus

## 2025-04-04 ENCOUNTER — HOSPITAL ENCOUNTER (OUTPATIENT)
Dept: GENERAL RADIOLOGY | Facility: HOSPITAL | Age: 80
Discharge: HOME | End: 2025-04-04
Payer: MEDICARE

## 2025-04-04 DIAGNOSIS — M25.552 LEFT HIP PAIN: ICD-10-CM

## 2025-04-04 PROCEDURE — 73502 X-RAY EXAM HIP UNI 2-3 VIEWS: CPT

## 2025-04-07 ENCOUNTER — RESULTS FOLLOW-UP (OUTPATIENT)
Dept: FAMILY MEDICINE CLINIC | Facility: CLINIC | Age: 80
End: 2025-04-07

## 2025-04-07 DIAGNOSIS — R20.0 NUMBNESS AND TINGLING OF RIGHT LOWER EXTREMITY: ICD-10-CM

## 2025-04-07 DIAGNOSIS — N28.9 RENAL LESION: ICD-10-CM

## 2025-04-07 DIAGNOSIS — R20.2 NUMBNESS AND TINGLING OF RIGHT LOWER EXTREMITY: ICD-10-CM

## 2025-04-07 DIAGNOSIS — M47.819 FACET ARTHROPATHY: ICD-10-CM

## 2025-04-07 DIAGNOSIS — M25.552 LEFT HIP PAIN: Primary | ICD-10-CM

## 2025-04-07 DIAGNOSIS — M54.50 CHRONIC LOW BACK PAIN, UNSPECIFIED BACK PAIN LATERALITY, UNSPECIFIED WHETHER SCIATICA PRESENT: ICD-10-CM

## 2025-04-07 DIAGNOSIS — G89.29 CHRONIC LOW BACK PAIN, UNSPECIFIED BACK PAIN LATERALITY, UNSPECIFIED WHETHER SCIATICA PRESENT: ICD-10-CM

## 2025-04-07 DIAGNOSIS — M43.00 SPONDYLOLYSIS: ICD-10-CM

## 2025-04-07 DIAGNOSIS — M48.00 NEUROFORAMINAL STENOSIS OF SPINE: ICD-10-CM

## 2025-04-07 NOTE — TELEPHONE ENCOUNTER
"LMTCB- Attempt 1    Relay     \"Mild degenerative changes of the hip noted, can refer to Ortho if patient desires \"                 "

## 2025-04-08 NOTE — TELEPHONE ENCOUNTER
Name: Leida Pan    Relationship: Self    Best Callback Number: [phone]     HUB PROVIDED THE RELAY MESSAGE FROM THE OFFICE   PATIENT VOICED UNDERSTANDING AND HAS NO FURTHER QUESTIONS AT THIS TIME

## 2025-04-11 NOTE — TELEPHONE ENCOUNTER
Caller: Leida Pan    Relationship to patient: Self    Best call back number: [phone]    PATIENT IS CALLING BACK TO LET HER PROVIDER KNOW SHE DOES NOT KNOW WHAT ORTHO PROVIDER SHE WOULD LIKE TO SEE BUT TO ASK PROVIDER CONCEPCIÓN TO CHOOSE FOR HER.

## 2025-04-21 ENCOUNTER — TRANSCRIBE ORDERS (OUTPATIENT)
Dept: ADMINISTRATIVE | Facility: HOSPITAL | Age: 80
End: 2025-04-21
Payer: MEDICARE

## 2025-04-21 DIAGNOSIS — Z12.31 BREAST CANCER SCREENING BY MAMMOGRAM: Primary | ICD-10-CM

## 2025-04-29 ENCOUNTER — OFFICE VISIT (OUTPATIENT)
Dept: SLEEP MEDICINE | Facility: HOSPITAL | Age: 80
End: 2025-04-29
Payer: MEDICARE

## 2025-04-29 VITALS
BODY MASS INDEX: 40.29 KG/M2 | OXYGEN SATURATION: 96 % | HEIGHT: 63 IN | SYSTOLIC BLOOD PRESSURE: 145 MMHG | DIASTOLIC BLOOD PRESSURE: 59 MMHG | HEART RATE: 70 BPM | WEIGHT: 227.4 LBS

## 2025-04-29 DIAGNOSIS — G47.33 OSA (OBSTRUCTIVE SLEEP APNEA): Primary | ICD-10-CM

## 2025-04-29 PROCEDURE — 3077F SYST BP >= 140 MM HG: CPT | Performed by: STUDENT IN AN ORGANIZED HEALTH CARE EDUCATION/TRAINING PROGRAM

## 2025-04-29 PROCEDURE — 1160F RVW MEDS BY RX/DR IN RCRD: CPT | Performed by: STUDENT IN AN ORGANIZED HEALTH CARE EDUCATION/TRAINING PROGRAM

## 2025-04-29 PROCEDURE — 1159F MED LIST DOCD IN RCRD: CPT | Performed by: STUDENT IN AN ORGANIZED HEALTH CARE EDUCATION/TRAINING PROGRAM

## 2025-04-29 PROCEDURE — G0463 HOSPITAL OUTPT CLINIC VISIT: HCPCS

## 2025-04-29 PROCEDURE — 3078F DIAST BP <80 MM HG: CPT | Performed by: STUDENT IN AN ORGANIZED HEALTH CARE EDUCATION/TRAINING PROGRAM

## 2025-04-29 PROCEDURE — 99213 OFFICE O/P EST LOW 20 MIN: CPT | Performed by: STUDENT IN AN ORGANIZED HEALTH CARE EDUCATION/TRAINING PROGRAM

## 2025-04-29 NOTE — PROGRESS NOTES
"  [facility]    SLEEP CLINIC FOLLOW UP PROGRESS NOTE.    Leida Pan  [id number]   1945  79 y.o.  female      PCP: Chanel Bunch, TOI    Date of visit: 4/29/2025    No chief complaint on file.      HPI:  This is a 79 y.o. years old patient is here for the management of obstructive sleep apnea.    She previously had a sleep study in [facility] in 2012 showing AHI of 7.1/h and she was started on CPAP.  She establish care with our office 10/2023.  Has gotten a new CPAP within the last few years after her Yonathan 1 was recalled.  She continues to have great usage of CPAP.  She Is using a nasal mask without chinstrap which she reports doing well with. She reports CPAP changed her life and she is still wake up 4-5 times per night and now she sleeps the whole night without waking up.  She goes to bed at 10 PM and gets up at 7 AM. She denies any sleep issues at this time.   She was having some leak last visit and chinstrap was recommended.     ESS: 0    PAP download data dates January 29 through April 28, 2025  Device: AirSense 10 AutoSet  Mask type: Nasal without chinstrap  Pressure : 6 to 15 cm  % days used >4 hours: 94  Average usage days used: 8 hours 59 minutes  AHI: 0.6  Median leak: 1.1 L/min  95th % leak 25.1 L/min  DME supplier: [employer] (habits pertaining to sleep medicine)  History tobacco use: None  History of alcohol use: None per week  Caffeine use: None per day  OB History    No obstetric history on file.         REVIEW OF SYSTEMS:   Pertaining positive symptoms are:  Dry mouth.      PHYSICAL EXAMINATION:  CONSTITUTIONAL:  Vitals:    04/29/25 0900   BP: 145/59   Pulse: 70   SpO2: 96%   Weight: 103 kg (227 lb 6.4 oz)   Height: 160 cm (62.99\")    Body mass index is 40.29 kg/m².   RESP SYSTEM: No respiratory distress  CARDIOVASULAR: Regular rate  NEURO: Answers questions appropriately     ASSESSMENT AND PLAN:  Diagnoses and all orders for this visit:    1. SERENA " (obstructive sleep apnea) (Primary)  -     PAP Therapy      I have independently reviewed the PAP compliance data and discussed with the patient the download data and encouarged the patient to continue to use the device. The device is benefiting the patient and the device is medically necessary.  The patient is also instructed to get the supplies from the [employer] and and change them on a regular basis.  A prescription for supplies has been sent to the DME company.   No changes in settings today. She reports doing well on current settings.   Discussed leak is likely related to mouth leak from review of detailed report. Chinstrap use was discussed last visit and today. Leak is intermittent. She is not waking up during the night. She declines chinstrap at this time.   Return in about 1 year (around 4/29/2026). . Patient's questions were answered.    Man Jean-Baptiste, DO

## 2025-05-01 ENCOUNTER — OFFICE VISIT (OUTPATIENT)
Dept: ORTHOPEDIC SURGERY | Facility: CLINIC | Age: 80
End: 2025-05-01
Payer: MEDICARE

## 2025-05-01 VITALS
OXYGEN SATURATION: 95 % | HEART RATE: 84 BPM | WEIGHT: 227 LBS | DIASTOLIC BLOOD PRESSURE: 89 MMHG | HEIGHT: 63 IN | SYSTOLIC BLOOD PRESSURE: 161 MMHG | BODY MASS INDEX: 40.22 KG/M2

## 2025-05-01 DIAGNOSIS — M70.62 TROCHANTERIC BURSITIS OF LEFT HIP: ICD-10-CM

## 2025-05-01 DIAGNOSIS — M25.552 LEFT HIP PAIN: Primary | ICD-10-CM

## 2025-05-01 NOTE — PROGRESS NOTES
"Chief Complaint  Initial Evaluation of the Left Hip     Subjective      Leida Pan presents to [facility] for initial evaluation of the left hip. She has pain in the left hip since a trip she was on in February.  She had a lot of extra walking then.  Before her vacation her hip was okay.  She has pain on the lateral aspect of the hip.  Her PCP ordered an X ray and is here to review.     Allergies   Allergen Reactions    Erythromycin Base Hallucinations     \"Turns me into a zombie\"    Tramadol Rash        Social History     Socioeconomic History    Marital status:    Tobacco Use    Smoking status: Never    Smokeless tobacco: Never   Vaping Use    Vaping status: Never Used   Substance and Sexual Activity    Alcohol use: Not Currently    Drug use: Never    Sexual activity: Defer        I reviewed the patient's chief complaint, history of present illness, review of systems, past medical history, surgical history, family history, social history, medications, and allergy list.     Review of Systems     Constitutional: Denies fevers, chills, weight loss  Cardiovascular: Denies chest pain, shortness of breath  Skin: Denies rashes, acute skin changes  Neurologic: Denies headache, loss of consciousness        Vital Signs:   /89   Pulse 84   Ht 160 cm (62.99\")   Wt 103 kg (227 lb)   SpO2 95%   BMI 40.22 kg/m²          Physical Exam  General: Alert. No acute distress    Ortho Exam        LEFT HIP  Mildly full ROM of the hip.  Tender over the greater trochanter.  No skin discoloration, atrophy or swelling. Positive EHL, FHL, GS, and TA. Sensation intact to all 5 nerves of the foot. Pain straight leg raise. Leg lengths appear equal. Ambulates with Antalgic gait  Knee Extensor Mechanism  intact        Procedures      Imaging Results (Most Recent)       None             Result Review :         XR Hip With or Without Pelvis 2 - 3 View Left  Result Date: 4/5/2025  Narrative: X-rays " left hip with pelvis Indication: Pain Comparison: None Technique: 2 views left hip, and AP pelvis Findings/    Impression: Impression: 1.No fracture or dislocation left hip. 2.Mild degenerative changes left hip. 3.Embolization coils overlie acetabulum. 4.No acute pelvic fracture. 5.Single view right hip without acute abnormality. Electronically Signed: James He MD  4/5/2025 8:01 PM EDT  Workstation ID: [id number]             Assessment and Plan     Diagnoses and all orders for this visit:    1. Left hip pain (Primary)    2. Trochanteric bursitis of left hip        Discussed the treatment plan with the patient. I reviewed the X-rays that were obtained 4/4/25 with the patient.     Discussed injection of the left hip and she denied this date.     HEP exercises given for hip bursitis.  She can call back for physical therapy orders if the home exercises doesn't help.     Prescribed topical cream.       Call or return if worsening symptoms.    Follow Up     PRN If pain persists she can come back for a left hip bursa injection.       Patient was given instructions and counseling regarding her condition or for health maintenance advice. Please see specific information pulled into the AVS if appropriate.     Scribed for Belinda Pozo MD by Jailene Warren MA.  05/01/25   10:32 EDT    I have personally performed the services described in this document as scribed by the above individual and it is both accurate and complete. Belinda Pozo MD 05/01/25

## 2025-05-13 RX ORDER — LEVOTHYROXINE SODIUM 25 UG/1
25 TABLET ORAL EVERY MORNING
Qty: 30 TABLET | Refills: 1 | OUTPATIENT
Start: 2025-05-13

## 2025-05-13 NOTE — TELEPHONE ENCOUNTER
Pt called to remind her to complete TSH/T4. Pt states she will go today or tomorrow, has about 5 pills left. Pt will call back if unable to get labs done this week and we will send in a refill as we dont want her going without.

## 2025-05-14 ENCOUNTER — LAB (OUTPATIENT)
Facility: HOSPITAL | Age: 80
End: 2025-05-14
Payer: MEDICARE

## 2025-05-14 ENCOUNTER — HOSPITAL ENCOUNTER (OUTPATIENT)
Dept: MRI IMAGING | Facility: HOSPITAL | Age: 80
Discharge: HOME | End: 2025-05-14
Payer: MEDICARE

## 2025-05-14 DIAGNOSIS — R20.2 NUMBNESS AND TINGLING OF RIGHT LOWER EXTREMITY: ICD-10-CM

## 2025-05-14 DIAGNOSIS — M54.50 CHRONIC LOW BACK PAIN, UNSPECIFIED BACK PAIN LATERALITY, UNSPECIFIED WHETHER SCIATICA PRESENT: ICD-10-CM

## 2025-05-14 DIAGNOSIS — G89.29 CHRONIC LOW BACK PAIN, UNSPECIFIED BACK PAIN LATERALITY, UNSPECIFIED WHETHER SCIATICA PRESENT: ICD-10-CM

## 2025-05-14 DIAGNOSIS — E03.9 HYPOTHYROIDISM, UNSPECIFIED TYPE: ICD-10-CM

## 2025-05-14 DIAGNOSIS — R20.0 NUMBNESS AND TINGLING OF RIGHT LOWER EXTREMITY: ICD-10-CM

## 2025-05-14 LAB
T4 FREE SERPL-MCNC: 1.16 NG/DL (ref 0.92–1.68)
TSH SERPL DL<=0.05 MIU/L-ACNC: 0.02 UIU/ML (ref 0.27–4.2)

## 2025-05-14 PROCEDURE — 84439 ASSAY OF FREE THYROXINE: CPT

## 2025-05-14 PROCEDURE — 36415 COLL VENOUS BLD VENIPUNCTURE: CPT

## 2025-05-14 PROCEDURE — 84443 ASSAY THYROID STIM HORMONE: CPT

## 2025-05-14 PROCEDURE — 72148 MRI LUMBAR SPINE W/O DYE: CPT

## 2025-05-15 ENCOUNTER — RESULTS FOLLOW-UP (OUTPATIENT)
Facility: HOSPITAL | Age: 80
End: 2025-05-15
Payer: MEDICARE

## 2025-05-15 DIAGNOSIS — E03.9 HYPOTHYROIDISM, UNSPECIFIED TYPE: Primary | ICD-10-CM

## 2025-05-15 RX ORDER — LEVOTHYROXINE SODIUM 25 UG/1
25 TABLET ORAL
Qty: 30 TABLET | Refills: 1 | Status: SHIPPED | OUTPATIENT
Start: 2025-05-15

## 2025-05-21 NOTE — TELEPHONE ENCOUNTER
Pt aware of lumbar spine MRI, will schedule MRI of abdomen.  Pt is scheduled to see neuro 06/12/25

## 2025-05-27 DIAGNOSIS — I10 HYPERTENSION, UNSPECIFIED TYPE: ICD-10-CM

## 2025-05-27 RX ORDER — HYDROCHLOROTHIAZIDE 12.5 MG/1
12.5 TABLET ORAL DAILY
Qty: 90 TABLET | Refills: 0 | Status: SHIPPED | OUTPATIENT
Start: 2025-05-27

## 2025-05-30 ENCOUNTER — HOSPITAL ENCOUNTER (OUTPATIENT)
Dept: MAMMOGRAPHY | Facility: HOSPITAL | Age: 80
Discharge: HOME | End: 2025-05-30
Admitting: NURSE PRACTITIONER
Payer: MEDICARE

## 2025-05-30 DIAGNOSIS — Z12.31 BREAST CANCER SCREENING BY MAMMOGRAM: ICD-10-CM

## 2025-05-30 PROCEDURE — 77067 SCR MAMMO BI INCL CAD: CPT

## 2025-05-30 PROCEDURE — 77063 BREAST TOMOSYNTHESIS BI: CPT

## 2025-06-01 DIAGNOSIS — E78.5 HYPERLIPIDEMIA, UNSPECIFIED HYPERLIPIDEMIA TYPE: ICD-10-CM

## 2025-06-02 RX ORDER — ATORVASTATIN CALCIUM 10 MG/1
10 TABLET, FILM COATED ORAL DAILY
Qty: 90 TABLET | Refills: 1 | Status: SHIPPED | OUTPATIENT
Start: 2025-06-02

## 2025-06-12 ENCOUNTER — OFFICE VISIT (OUTPATIENT)
Dept: NEUROSURGERY | Facility: CLINIC | Age: 80
End: 2025-06-12
Payer: MEDICARE

## 2025-06-12 ENCOUNTER — LAB (OUTPATIENT)
Facility: HOSPITAL | Age: 80
End: 2025-06-12
Payer: MEDICARE

## 2025-06-12 VITALS
SYSTOLIC BLOOD PRESSURE: 132 MMHG | BODY MASS INDEX: 40.5 KG/M2 | HEIGHT: 63 IN | WEIGHT: 228.6 LBS | DIASTOLIC BLOOD PRESSURE: 87 MMHG

## 2025-06-12 DIAGNOSIS — M48.061 SPINAL STENOSIS OF LUMBAR REGION WITH RADICULOPATHY: Primary | ICD-10-CM

## 2025-06-12 DIAGNOSIS — E03.9 HYPOTHYROIDISM, UNSPECIFIED TYPE: ICD-10-CM

## 2025-06-12 DIAGNOSIS — M54.16 SPINAL STENOSIS OF LUMBAR REGION WITH RADICULOPATHY: Primary | ICD-10-CM

## 2025-06-12 LAB
T4 FREE SERPL-MCNC: 0.93 NG/DL (ref 0.92–1.68)
TSH SERPL DL<=0.05 MIU/L-ACNC: 1.84 UIU/ML (ref 0.27–4.2)

## 2025-06-12 PROCEDURE — 36415 COLL VENOUS BLD VENIPUNCTURE: CPT

## 2025-06-12 PROCEDURE — 84439 ASSAY OF FREE THYROXINE: CPT

## 2025-06-12 PROCEDURE — 84443 ASSAY THYROID STIM HORMONE: CPT

## 2025-06-12 NOTE — PROGRESS NOTES
"Chief Complaint  Back Pain    Subjective            Leida Pan who is a 79 y.o. year old female who presents to [facility] for Evaluation of the Spine.     History of Present Illness  The patient is a 79-year-old female who presents for evaluation of right leg numbness.    She reports persistent numbness in her right leg extending to the entire foot, which began in late 11/2024 or early 12/2024. The numbness is constant and is not associated with pain unless she engages in extensive walking, such as 4 consecutive days of walking approximately 17,000 to 18,000 steps. She also experiences weakness in the same leg. Despite undergoing physical therapy, she has not found any relief from her symptoms. She recalls a previous episode of sciatica in the same leg 20 years ago, which was successfully treated with an epidural steroid injection.    Additionally, she mentions morning stiffness in her lower back, a condition that has persisted for some time. She has noticed a decrease in her height, now measuring 5 feet 3 inches, compared to her previous stature.    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 She has bursitis in her left hip.    Social History:    Occupations: Retired    This patient  reports that she has never smoked. She has never used smokeless tobacco.    Review of Systems   Musculoskeletal:  Positive for back pain and myalgias.      Objective   Vital Signs:   /87 (BP Location: Left arm, Patient Position: Sitting)   Ht 160 cm (62.99\")   Wt 104 kg (228 lb 9.6 oz)   BMI 40.51 kg/m²       Physical Exam  Constitutional:       Comments: BMI 40.5   Cardiovascular:      Comments: No notable edema   Pulmonary:      Effort: Pulmonary effort is normal.   Musculoskeletal:         General: No tenderness.   Neurological:      Mental Status: She is alert.      Sensory: No sensory deficit.      Motor: No weakness.      Deep " Tendon Reflexes: Reflexes normal.   Psychiatric:         Mood and Affect: Mood normal.          Result Review :   I personally interpreted the patient's MRI scan with the patient.     Results  Labs   - A1c: 6.2    Imaging   - MRI of lower back: Significant degenerative arthritis, multiple disk bulges, disk height loss, mild narrowing at L2-3, and moderate to severe foraminal narrowing at L4-5       Assessment and Plan    There are no diagnoses linked to this encounter.    Assessment & Plan  1. Right leg numbness.     - Symptoms suggest radiculopathy rather than neurogenic claudication or severe spinal stenosis. MRI reveals significant degenerative arthritis, multilevel disk bulges, and varying degrees of foraminal narrowing, most severe at L4-L5, potentially causing numbness in the L5 distribution.     - Previous physical therapy did not yield improvement.     - Surgical intervention, specifically lateral recess decompression foraminotomy, was discussed as a potential option.     - Pharmacological management with gabapentin or Lyrica was considered to alleviate leg symptoms.     - An epidural steroid injection was suggested as a temporary measure, given its past effectiveness in treating her sciatica 20 years ago. However, it was emphasized that this would not provide a long-term solution due to the degenerative nature of her current condition.    2. Degenerative arthritis.     - MRI shows significant degenerative arthritis with multiple disk bulges and disk height loss, resulting in approximately one inch of height loss due to lumbar spine disk space loss.     - The condition is not dangerous, with no severe cord compression present.     - Reassurance was provided that the condition is not life-threatening and that numerous symptoms would occur before any serious complications arise.     - Advised to monitor walking distance and rest if experiencing leg pain.     Follow Up   No follow-ups on file.  Patient was  given instructions and counseling regarding her condition or for health maintenance advice. Please see specific information pulled into the AVS if appropriate.     Patient or patient representative verbalized consent for the use of Ambient Listening during the visit with  Gildardo Nowak MD for chart documentation. 6/12/2025  14:16 EDT

## 2025-06-24 ENCOUNTER — OFFICE VISIT (OUTPATIENT)
Dept: ORTHOPEDIC SURGERY | Facility: CLINIC | Age: 80
End: 2025-06-24
Payer: MEDICARE

## 2025-06-24 VITALS
DIASTOLIC BLOOD PRESSURE: 87 MMHG | OXYGEN SATURATION: 94 % | WEIGHT: 228 LBS | HEIGHT: 62 IN | SYSTOLIC BLOOD PRESSURE: 161 MMHG | BODY MASS INDEX: 41.96 KG/M2 | HEART RATE: 76 BPM

## 2025-06-24 DIAGNOSIS — M70.62 TROCHANTERIC BURSITIS OF LEFT HIP: Primary | ICD-10-CM

## 2025-06-25 RX ORDER — LIDOCAINE HYDROCHLORIDE 10 MG/ML
5 INJECTION, SOLUTION INFILTRATION; PERINEURAL
Status: COMPLETED | OUTPATIENT
Start: 2025-06-25 | End: 2025-06-25

## 2025-06-25 RX ORDER — TRIAMCINOLONE ACETONIDE 40 MG/ML
40 INJECTION, SUSPENSION INTRA-ARTICULAR; INTRAMUSCULAR
Status: COMPLETED | OUTPATIENT
Start: 2025-06-25 | End: 2025-06-25

## 2025-06-25 RX ADMIN — TRIAMCINOLONE ACETONIDE 40 MG: 40 INJECTION, SUSPENSION INTRA-ARTICULAR; INTRAMUSCULAR at 07:49

## 2025-06-25 RX ADMIN — LIDOCAINE HYDROCHLORIDE 5 ML: 10 INJECTION, SOLUTION INFILTRATION; PERINEURAL at 07:49

## 2025-06-25 NOTE — PROGRESS NOTES
"Chief Complaint  Follow-up of the Left Hip       Subjective      Leida Pan presents to [facility] for a follow up for her left hip. Her left hip has been bothering her since she went on vacation to [place] last year and did a lot of walking. She states she did some home exercises and stretching since her last visit but is still having pain to her left hip. She locates her pain to the lateral hip.     Allergies   Allergen Reactions    Erythromycin Base Hallucinations     \"Turns me into a zombie\"    Tramadol Rash        Social History     Socioeconomic History    Marital status:    Tobacco Use    Smoking status: Never    Smokeless tobacco: Never   Vaping Use    Vaping status: Never Used   Substance and Sexual Activity    Alcohol use: Not Currently    Drug use: Never    Sexual activity: Defer        I reviewed the patient's chief complaint, history of present illness, review of systems, past medical history, surgical history, family history, social history, medications, and allergy list.     Review of Systems     Constitutional: Denies fevers, chills, weight loss  Cardiovascular: Denies chest pain, shortness of breath  Skin: Denies rashes, acute skin changes  Neurologic: Denies headache, loss of consciousness  MSK: left hip pain       Vital Signs:   /87   Pulse 76   Ht 157.5 cm (62\")   Wt 103 kg (228 lb)   SpO2 94%   BMI 41.70 kg/m²          Physical Exam  General: Alert. No acute distress    Ortho Exam        Left lower extremity: tender to the lateral  hip bursitis, intact hip range of motion, negative  straight leg raise, distal neurovascularly intact, positive  pulses, calf soft, positive EHL, FHL, GS, and TA. Sensation intact to all 5 nerves of the foot.       Large Joint Arthrocentesis: L greater trochanteric bursa  Date/Time: 6/25/2025 7:49 AM  Consent given by: patient  Site marked: site marked  Timeout: Immediately prior to procedure a time out was called to " verify the correct patient, procedure, equipment, support staff and site/side marked as required   Supporting Documentation  Indications: pain   Procedure Details  Location: hip - L greater trochanteric bursa  Preparation: Patient was prepped and draped in the usual sterile fashion  Needle gauge: 21 G.  Medications administered: 5 mL lidocaine 1 %; 40 mg triamcinolone acetonide 40 MG/ML  Patient tolerance: patient tolerated the procedure well with no immediate complications          Imaging Results (Most Recent)       None             Result Review :       Mammo Screening Digital Tomosynthesis Bilateral With CAD  Result Date: 5/30/2025  Narrative: MAMMO SCREENING DIGITAL TOMOSYNTHESIS BILATERAL W CAD-  Date of Exam: 5/30/2025 11:40 AM  Indication: Screening.  Comparison: Numerous mammograms between May 29, 2024 and February 22, 2019  Technique: Routine screening mammogram images were obtained per protocol.  Tomosynthesis was utilized.  These mammographic images were interpreted with the assistance of a computer aided detection system.  Breast Density: The breasts are almost entirely fatty.  Findings: There has been interval increase in breast density and decreased compression thickness, consistent with weight loss. No suspicious masses, suspicious microcalcifications, or architectural distortions are identified.      Impression: No mammographic evidence of malignancy.  Recommend annual screening mammography.  BI-RADS ASSESSMENT: Category 2: Benign  Note: It has been reported that there is approximately a 15% false negative rate in mammography.  Therefore, management of a palpable abnormality should not be deferred because of a negative mammogram.  5/30/2025 6:20 PM by Mehran Hurd on Workstation: [id number]               Assessment and Plan     Diagnoses and all orders for this visit:    1. Trochanteric bursitis of left hip (Primary)        The patient presents here today for a follow up for her left hip.      Discussed the risks and benefits of a left hip bursitis injection today in the office. Patient expressed understanding and wishes to proceed. Patient tolerted the injection well and without any complications.     Home exercises given today and will continue current medications for pain control.     Call or return if worsening symptoms.    Follow Up     PRN       Patient was given instructions and counseling regarding her condition or for health maintenance advice. Please see specific information pulled into the AVS if appropriate.     TransScribed for Belinda Pozo MD by Karon Perez.  06/24/25   21:18 EDT    I have personally performed the services described in this document as scribed by the above individual and it is both accurate and complete. Belinda Pozo MD 06/25/25

## 2025-06-27 ENCOUNTER — RESULTS FOLLOW-UP (OUTPATIENT)
Dept: MRI IMAGING | Facility: HOSPITAL | Age: 80
End: 2025-06-27
Payer: MEDICARE

## 2025-06-27 ENCOUNTER — HOSPITAL ENCOUNTER (OUTPATIENT)
Dept: MRI IMAGING | Facility: HOSPITAL | Age: 80
Discharge: HOME | End: 2025-06-27
Payer: MEDICARE

## 2025-06-27 DIAGNOSIS — N28.9 RENAL LESION: ICD-10-CM

## 2025-06-27 LAB
CREAT BLDA-MCNC: 0.7 MG/DL (ref 0.6–1.3)
EGFRCR SERPLBLD CKD-EPI 2021: 89.2 ML/MIN/1.73

## 2025-06-27 PROCEDURE — 74183 MRI ABD W/O CNTR FLWD CNTR: CPT

## 2025-06-27 PROCEDURE — 82565 ASSAY OF CREATININE: CPT

## 2025-06-27 PROCEDURE — 25510000001 GADOPICLENOL 0.5 MMOL/ML SOLUTION: Performed by: NURSE PRACTITIONER

## 2025-06-27 PROCEDURE — A9573 GADOPICLENOL 0.5 MMOL/ML SOLUTION: HCPCS | Performed by: NURSE PRACTITIONER

## 2025-06-27 RX ADMIN — GADOPICLENOL 10 ML: 485.1 INJECTION INTRAVENOUS at 07:49

## 2025-06-27 NOTE — PROGRESS NOTES
I am covering for Chanel today.  Point-of-care creatinine and GFR within normal limits.  It appears this was obtained in conjunction with an MRI of the abdomen Morena has ordered on this patient.  That test has not resulted as of the time of this note.    Thank you,    Dionisio Spence      This document has been electronically signed by Dionisio Spence MD on June 27, 2025 09:59 EDT

## 2025-07-01 ENCOUNTER — RESULTS FOLLOW-UP (OUTPATIENT)
Dept: FAMILY MEDICINE CLINIC | Facility: CLINIC | Age: 80
End: 2025-07-01
Payer: MEDICARE

## 2025-07-02 ENCOUNTER — OFFICE VISIT (OUTPATIENT)
Dept: FAMILY MEDICINE CLINIC | Facility: CLINIC | Age: 80
End: 2025-07-02
Payer: MEDICARE

## 2025-07-02 VITALS
OXYGEN SATURATION: 97 % | SYSTOLIC BLOOD PRESSURE: 151 MMHG | WEIGHT: 225 LBS | HEART RATE: 70 BPM | BODY MASS INDEX: 41.41 KG/M2 | DIASTOLIC BLOOD PRESSURE: 71 MMHG | HEIGHT: 62 IN

## 2025-07-02 DIAGNOSIS — N28.89 RENAL MASS: Primary | ICD-10-CM

## 2025-07-02 DIAGNOSIS — K86.9 PANCREATIC LESION: ICD-10-CM

## 2025-07-02 DIAGNOSIS — K80.20 GALLSTONES: ICD-10-CM

## 2025-07-02 DIAGNOSIS — R93.5 ABNORMAL MRI OF ABDOMEN: ICD-10-CM

## 2025-07-02 DIAGNOSIS — E03.9 HYPOTHYROIDISM, UNSPECIFIED TYPE: ICD-10-CM

## 2025-07-02 DIAGNOSIS — Z86.718 HISTORY OF DVT (DEEP VEIN THROMBOSIS): ICD-10-CM

## 2025-07-02 RX ORDER — LEVOTHYROXINE SODIUM 25 UG/1
25 TABLET ORAL
Qty: 90 TABLET | Refills: 1 | Status: SHIPPED | OUTPATIENT
Start: 2025-07-02

## 2025-07-02 NOTE — ASSESSMENT & PLAN NOTE
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 Pt reports never had hypercoag work -up, we will refer  to hematology

## 2025-07-02 NOTE — PROGRESS NOTES
Chief Complaint  Imaging Only (RESULTS )    SUBJECTIVE  Leida Pan presents to [facility].    Patient here today to discuss results of MRI of abdomen.    Patient notes that she is concerned about a potential DVT developing if she has to have surgery for this renal mass as she has had a DVT 2 times in the past.  States she never really had a workup as they were both considered provoked, 1 due to pregnancy, 1 due to surgery    History of Present Illness  Past Medical History:   Diagnosis Date    Arthritis     Cataract     Deep vein thrombosis 1979    History of medical problems       Family History   Problem Relation Age of Onset    Cancer Mother     Heart disease Mother     Heart disease Father     Cancer Father       Past Surgical History:   Procedure Laterality Date    BARIATRIC SURGERY  2019    TUBAL ABDOMINAL LIGATION  1980        Current Outpatient Medications:     albuterol sulfate  (90 Base) MCG/ACT inhaler, Inhale 2 puffs Every 4 (Four) Hours As Needed for Wheezing or Shortness of Air., Disp: 8 g, Rfl: 0    atorvastatin (LIPITOR) 10 MG tablet, TAKE 1 TABLET BY MOUTH EVERY DAY, Disp: 90 tablet, Rfl: 1    calcium acetate (PHOS BINDER,) 667 MG capsule capsule, Take 2 capsules by mouth 3 (Three) Times a Day., Disp: , Rfl:     Cobalamin Combinations (B-12) 100-5000 MCG sublingual tablet, Place  under the tongue. 1 A WEEK, Disp: , Rfl:     Diclofenac Sodium (VOLTAREN) 1 % gel gel, Apply 4 g topically to the appropriate area as directed 4 (Four) Times a Day., Disp: 100 g, Rfl: 1    hydroCHLOROthiazide 12.5 MG tablet, TAKE 1 TABLET BY MOUTH EVERY DAY, Disp: 90 tablet, Rfl: 0    iron polysacch complex-B12-FA (FERREX 150 FORTE) 150-0.025-1 MG capsule capsule, Take  by mouth Daily., Disp: , Rfl:     levothyroxine (SYNTHROID, LEVOTHROID) 25 MCG tablet, Take 1 tablet by mouth Every Morning., Disp: 90 tablet, Rfl: 1    metFORMIN (GLUCOPHAGE) 500 MG tablet, Take 1 tablet by mouth 2  "(Two) Times a Day With Meals., Disp: 180 tablet, Rfl: 1    multivitamin with minerals tablet tablet, Take 1 tablet by mouth Daily., Disp: , Rfl:     triamcinolone (KENALOG) 0.1 % ointment, Apply  topically to the appropriate area as directed., Disp: , Rfl:     OBJECTIVE  Vital Signs:   /71   Pulse 70   Ht 157.5 cm (62\")   Wt 102 kg (225 lb)   SpO2 97%   BMI 41.15 kg/m²    Estimated body mass index is 41.15 kg/m² as calculated from the following:    Height as of this encounter: 157.5 cm (62\").    Weight as of this encounter: 102 kg (225 lb).     Wt Readings from Last 3 Encounters:   07/02/25 102 kg (225 lb)   06/24/25 103 kg (228 lb)   06/12/25 104 kg (228 lb 9.6 oz)     BP Readings from Last 3 Encounters:   07/02/25 151/71   06/24/25 161/87   06/12/25 132/87       Physical Exam  Vitals (abd) reviewed.   Constitutional:       Appearance: Normal appearance. She is well-developed.   HENT:      Head: Normocephalic and atraumatic.      Right Ear: External ear normal.      Left Ear: External ear normal.   Eyes:      Conjunctiva/sclera: Conjunctivae normal.      Pupils: Pupils are equal, round, and reactive to light.   Cardiovascular:      Rate and Rhythm: Normal rate and regular rhythm.      Heart sounds: No murmur heard.     No friction rub. No gallop.   Pulmonary:      Effort: Pulmonary effort is normal.      Breath sounds: Normal breath sounds. No wheezing or rhonchi.   Skin:     General: Skin is warm and dry.   Neurological:      Mental Status: She is alert and oriented to person, place, and time.      Cranial Nerves: No cranial nerve deficit.   Psychiatric:         Mood and Affect: Mood and affect normal.         Behavior: Behavior normal.         Thought Content: Thought content normal.         Judgment: Judgment normal.          Result Review    CMP          3/13/2025    07:51 6/27/2025    07:33   CMP   Glucose 102     BUN 25     Creatinine 0.76  0.70    EGFR 80.8  89.2    Sodium 143     Potassium 3.9   "   Chloride 106     Calcium 9.6     Total Protein 7.2     Albumin 4.0     Globulin 3.2     Total Bilirubin 0.3     Alkaline Phosphatase 60     AST (SGOT) 24     ALT (SGPT) 24     Albumin/Globulin Ratio 1.3     BUN/Creatinine Ratio 32.9     Anion Gap 11.1       CBC          3/13/2025    07:51   CBC   WBC 8.53    RBC 5.14    Hemoglobin 15.0    Hematocrit 46.9    MCV 91.2    MCH 29.2    MCHC 32.0    RDW 13.4    Platelets 289      Lipid Panel          3/13/2025    07:51   Lipid Panel   Total Cholesterol 159    Triglycerides 161    HDL Cholesterol 47    VLDL Cholesterol 28    LDL Cholesterol  84    LDL/HDL Ratio 1.70      TSH          3/13/2025    07:51 5/14/2025    07:51 6/12/2025    14:54   TSH   TSH 5.230  0.017  1.840      Most Recent A1C          3/13/2025    07:51   HGBA1C Most Recent   Hemoglobin A1C 6.20        MRI Abdomen With & Without Contrast  Result Date: 7/1/2025  Impression: 3 cm solid enhancing left renal neoplasm, suspicious for renal cell carcinoma. Recommend urology referral for further management. 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 Findings may be of low clinical significance in this demographic, though consider 12-month follow-up MRI/MRCP to evaluate for stability. Mild hepatic steatosis. Cholelithiasis without evidence of acute cholecystitis. Electronically Signed: Aaron Jimenes MD  7/1/2025 9:23 AM EDT  Workstation ID: [id number]    Mammo Screening Digital Tomosynthesis Bilateral With CAD  Result Date: 5/30/2025  No mammographic evidence of malignancy.  Recommend annual screening mammography.  BI-RADS ASSESSMENT: Category 2: Benign  Note: It has been reported that there is approximately a 15% false negative rate in mammography.  Therefore, management of a palpable abnormality should not be deferred because of a negative mammogram.  5/30/2025 6:20 PM by Mehran Hurd on Workstation: [id number]      MRI Lumbar Spine Without  Contrast  Result Date: 5/18/2025  Impression: 1. Multilevel lumbar spondylosis, without resulting high-grade thecal sac stenosis. Varying degrees of neuroforaminal narrowing up to moderate to severe right greater than left at L4-L5. Level-by-level analysis detailed above. 2. Multilevel mixed Modic degenerative endplate change. 3. No acute MRI findings of the lumbar spine. 4. Paraspinous muscle atrophy. 5. Incidental heterogeneous 2.2 cm left inferior pole renal lesion. Differential includes complex cyst versus possible neoplasm. Recommend nonemergent abdominal MRI without and with IV contrast. Electronically Signed: James Gonzales MD  5/18/2025 4:13 PM EDT  Workstation ID: [id number]    XR Hip With or Without Pelvis 2 - 3 View Left  Result Date: 4/5/2025  Impression: 1.No fracture or dislocation left hip. 2.Mild degenerative changes left hip. 3.Embolization coils overlie acetabulum. 4.No acute pelvic fracture. 5.Single view right hip without acute abnormality. Electronically Signed: James He MD  4/5/2025 8:01 PM EDT  Workstation ID: [id number]       The above data has been reviewed by TOI Lemus 07/02/2025 09:10 EDT.          Patient Care Team:  Chanel Bunch APRN as PCP - General (Nurse Practitioner)            ASSESSMENT & PLAN    Diagnoses and all orders for this visit:    1. Renal mass (Primary)  Comments:  Renal mass suspicious for renal cell carcinoma, urgent referral to urology placed  Orders:  -     Ambulatory Referral to Urology    2. Hypothyroidism, unspecified type  Assessment & Plan:  Patient doing well with levothyroxine 25 mcg, 90 supply sent    Orders:  -     levothyroxine (SYNTHROID, LEVOTHROID) 25 MCG tablet; Take 1 tablet by mouth Every Morning.  Dispense: 90 tablet; Refill: 1    3. History of DVT (deep vein thrombosis)  Assessment & Plan:  Pt states had DVT during pregnancy first time, then again in 2018 post surgery after stomach surgery. Pt reports never had hypercoag  work -up, we will refer  to hematology     Orders:  -     Ambulatory Referral to Hematology    4. Pancreatic lesion  -     MRI abdomen w wo contrast mrcp; Future    5. Abnormal MRI of abdomen  -     MRI abdomen w wo contrast mrcp; Future    6. Gallstones  Comments:  Patient denies any symptoms or problems, we will monitor for now and refer if needed    Reports has good family support, no further questions at this time.    Tobacco Use: Low Risk  (7/2/2025)    Patient History     Smoking Tobacco Use: Never     Smokeless Tobacco Use: Never     Passive Exposure: Not on file       Follow Up     Return if symptoms worsen or fail to improve.        Patient was given instructions and counseling regarding her condition or for health maintenance advice. Please see specific information pulled into the AVS if appropriate.   I have reviewed information obtained and documented by others and I have confirmed the accuracy of this documented note.    TOI Lemus

## 2025-07-09 ENCOUNTER — OFFICE VISIT (OUTPATIENT)
Dept: UROLOGY | Age: 80
End: 2025-07-09
Payer: MEDICARE

## 2025-07-09 VITALS — BODY MASS INDEX: 41.41 KG/M2 | HEIGHT: 62 IN | RESPIRATION RATE: 18 BRPM | WEIGHT: 225 LBS

## 2025-07-09 DIAGNOSIS — N28.89 RENAL MASS: Primary | ICD-10-CM

## 2025-07-09 DIAGNOSIS — Z86.718 HISTORY OF DVT (DEEP VEIN THROMBOSIS): ICD-10-CM

## 2025-07-09 NOTE — PROGRESS NOTES
UROLOGY OFFICE H&P NOTE    Subjective   HPI  Leida Pan is a 79 y.o. female.   History of Present Illness  The patient presents for evaluation of a left kidney mass.    She has been experiencing back pain due to bulging discs, which has also resulted in numbness in her right leg. An initial MRI revealed a suspicious area on her left kidney, prompting a follow-up MRI focused on this region.     She reports no history of smoking, hematuria, or unintentional weight loss.     She has a history of deep vein thrombosis (DVT), with the first episode occurring during pregnancy 45 years ago and the second episode a few weeks after undergoing stomach stapling surgery for weight loss. She was hospitalized for several weeks during her first DVT episode due to her pregnancy. She has not had any pulmonary embolism.   She is not currently on any blood thinners and has been referred to a hematologist for further evaluation.  Consultation pending    Her risk of recurrence of DVT with surgical intervention is very concerning to her.      She is interested in understanding the potential hospital stay duration if she were to undergo surgery and whether the procedure would be performed robotically. She is also curious about the possibility of radiation therapy or chemotherapy for her condition. The patient expresses concern about the cancer spreading as her  passed away from soft tissue sarcoma 20 years ago.    She smoked for a couple of years as a teenager.      _______  Creatinine  6/27/25: 0.70  3/13/25: 0.76    MRI abd wwo 6/27/25: Arising from the posterior aspect of the lower pole of the left kidney is a 3.0 x 2.1 x 2.3 cm (AP by TV by CC) solid enhancing mass consistent with a neoplasm, corresponding to findings on lumbar spine MRI (series 23 image 32). There are otherwise small   simple renal cysts bilaterally (Bosniak I), largest in the right kidney measuring 2.1 cm (series 23 image 36). Background renal parenchyma  "is symmetric in size and enhancement. No hydronephrosis.          Medical History:  Past Medical History:   Diagnosis Date    Arthritis     Cataract     Deep vein thrombosis 1979    History of medical problems         Social History:  Social History     Socioeconomic History    Marital status:    Tobacco Use    Smoking status: Never    Smokeless tobacco: Never   Vaping Use    Vaping status: Never Used   Substance and Sexual Activity    Alcohol use: Not Currently    Drug use: Never    Sexual activity: Defer        Family History:  Family History   Problem Relation Age of Onset    Cancer Mother     Heart disease Mother     Heart disease Father     Cancer Father         Surgical History:  Past Surgical History:   Procedure Laterality Date    BARIATRIC SURGERY  2019    TUBAL ABDOMINAL LIGATION  1980        Allergies:  Allergies   Allergen Reactions    Erythromycin Base Hallucinations     \"Turns me into a zombie\"    Tramadol Rash        Current Medications:  Current Outpatient Medications   Medication Sig Dispense Refill    albuterol sulfate  (90 Base) MCG/ACT inhaler Inhale 2 puffs Every 4 (Four) Hours As Needed for Wheezing or Shortness of Air. 8 g 0    atorvastatin (LIPITOR) 10 MG tablet TAKE 1 TABLET BY MOUTH EVERY DAY 90 tablet 1    calcium acetate (PHOS BINDER,) 667 MG capsule capsule Take 2 capsules by mouth 3 (Three) Times a Day.      Cobalamin Combinations (B-12) 100-5000 MCG sublingual tablet Place  under the tongue. 1 A WEEK      Diclofenac Sodium (VOLTAREN) 1 % gel gel Apply 4 g topically to the appropriate area as directed 4 (Four) Times a Day. 100 g 1    hydroCHLOROthiazide 12.5 MG tablet TAKE 1 TABLET BY MOUTH EVERY DAY 90 tablet 0    iron polysacch complex-B12-FA (FERREX 150 FORTE) 150-0.025-1 MG capsule capsule Take  by mouth Daily.      levothyroxine (SYNTHROID, LEVOTHROID) 25 MCG tablet Take 1 tablet by mouth Every Morning. 90 tablet 1    metFORMIN (GLUCOPHAGE) 500 MG tablet Take 1 tablet " by mouth 2 (Two) Times a Day With Meals. 180 tablet 1    multivitamin with minerals tablet tablet Take 1 tablet by mouth Daily.      triamcinolone (KENALOG) 0.1 % ointment Apply  topically to the appropriate area as directed.       No current facility-administered medications for this visit.       Review of systems  A review of systems was performed, and positive findings are noted in the HPI.    Objective     Vital Signs:   There were no vitals taken for this visit.      Physical exam  No acute distress, well-nourished  awake alert and oriented  Mood normal; affect normal  Physical Exam          Problem List:  Patient Active Problem List   Diagnosis    Elevated blood pressure reading    Prediabetes    Hyperlipidemia    Sleep apnea    Class 2 severe obesity with serious comorbidity and body mass index (BMI) of 39.0 to 39.9 in adult    Hypertension    Hypothyroidism    History of DVT (deep vein thrombosis)       Assessment & Plan   Diagnoses and all orders for this visit:    1. Renal mass (Primary)    2. History of DVT (deep vein thrombosis)      MRI images personally reviewed, demonstrate discussed with patient at length  The patient  was counseled regarding diagnostic results, prognosis and risks and benefits of treatment options.             Small Renal Mass. The patient has a 3.0 x 2.1 x 2.3 cm lower pole posterior renal mass concerning for renal cell carcinoma.  Discussed has concerning features of primary renal malignancy however diagnosis is uncertain without pathology.    Believed to be amendable to Nephron Sparing Surgery. I discussed the risks of this including bleeding, infection, pain, damage to surrounding structure, injury to ureter and renal pelvis, urine leak, renal insufficiency, recurrence, margin positivity, need to convert to open procedure, and need for nephrectomy, and risk of anesthesia.     I discussed the role of biopsy in my opinion and the degree of false negative biopsies and the 85%  likelihood of a small renal mass being malignant.      Patient aware that statistically speaking in untreated patients with an enhancing renal mass of less than or equal to 3 cm, the risk of developing metastasis within 3 years of diagnosis is less than 1%.  She is also aware that active surveillance is a reasonable option supported by AUA guidelines given a patient's individual risk factors and goals.  Risk of surveillance is lowest when renal masses less than 3 cm.  Among patients undergoing surveillance, the main tumor growth rate is 0.3 cm/year.  Aware that tumor growth rate does not reliably distinguish between benign and malignant lesions.  However, metastasis occurs almost exclusively in patients whose primary tumor demonstrates interval growth.     Treatment of the renal mass may be reasonable in any of the following circumstances  -mass grows larger than 3 cm  -Mass demonstrates ongoing interval growth (especially more than 0.3-0.5 cm/year)  - Patient developed symptoms attributable to the renal mass  - Biopsy shows high-grade cancer     Patient percent participated in the discussion of options for management including surveillance and surgery.  States she is not comfortable with consideration of surveillance particularly given the passing of her  from cancer.     Await recommendations given her history of DVT after surgical intervention and pregnancy.    Did also offer secondary opinion to tertiary center.  Will discuss imaging with colleagues to continue to assess appropriate surgical approach pending hematology oncology recommendations.       Assessment & Plan  Treatment to be determined depending on the patient's comfort level and the hematologist's recommendations. The patient was informed that there is no chemotherapy or radiation for treatment of kidney cancer, but immunotherapies can be considered are available for advanced cases.   At the current size, presuming it is kidney cancer, it would  be considered stage 1a, with a high risk of cure with surgical intervention.     The patient was advised to schedule an appointment with hematology oncology and keep us updated on their recommendations. The case will be reviewed with a partner to determine the best approach for surgery, and if necessary, a referral to Dr. Jernigan at the [facility] will be facilitated.    Follow-up  A follow-up appointment will be scheduled in 1 month.       All questions addressed      Patient or patient representative verbalized consent for the use of Ambient Listening during the visit with  Barbara Adan MD for chart documentation. 7/10/2025  19:26 EDT    MDM moderate: 1 undiagnosed new problem uncertain prognosis; independent interpretation of test performed by other professional; i.e. actual MRI imaging from 6/27/2025 performed at [facility] reviewed with patient  provided independent interpretation and management recommendation

## 2025-07-23 ENCOUNTER — LAB (OUTPATIENT)
Dept: ONCOLOGY | Facility: HOSPITAL | Age: 80
End: 2025-07-23
Payer: MEDICARE

## 2025-07-23 ENCOUNTER — CONSULT (OUTPATIENT)
Dept: ONCOLOGY | Facility: HOSPITAL | Age: 80
End: 2025-07-23
Payer: MEDICARE

## 2025-07-23 VITALS
OXYGEN SATURATION: 96 % | HEART RATE: 76 BPM | HEIGHT: 62 IN | DIASTOLIC BLOOD PRESSURE: 87 MMHG | RESPIRATION RATE: 18 BRPM | SYSTOLIC BLOOD PRESSURE: 152 MMHG | BODY MASS INDEX: 41.38 KG/M2 | TEMPERATURE: 98.1 F | WEIGHT: 224.87 LBS

## 2025-07-23 DIAGNOSIS — D64.9 ANEMIA, UNSPECIFIED TYPE: Primary | ICD-10-CM

## 2025-07-23 DIAGNOSIS — Z86.718 PERSONAL HISTORY OF OTHER VENOUS THROMBOSIS AND EMBOLISM: ICD-10-CM

## 2025-07-23 DIAGNOSIS — D68.59 THROMBOPHILIA: Primary | ICD-10-CM

## 2025-07-23 DIAGNOSIS — Z86.718 HISTORY OF DVT (DEEP VEIN THROMBOSIS): ICD-10-CM

## 2025-07-23 DIAGNOSIS — Z86.718 PERSONAL HISTORY OF DVT (DEEP VEIN THROMBOSIS): ICD-10-CM

## 2025-07-23 LAB
ALBUMIN SERPL-MCNC: 4.1 G/DL (ref 3.5–5.2)
ALBUMIN/GLOB SERPL: 1.2 G/DL
ALP SERPL-CCNC: 65 U/L (ref 39–117)
ALT SERPL W P-5'-P-CCNC: 19 U/L (ref 1–33)
ANION GAP SERPL CALCULATED.3IONS-SCNC: 9.3 MMOL/L (ref 5–15)
AST SERPL-CCNC: 16 U/L (ref 1–32)
AT III PPP CHRO-ACNC: 131 % (ref 94–138)
BASOPHILS # BLD AUTO: 0.07 10*3/MM3 (ref 0–0.2)
BASOPHILS NFR BLD AUTO: 0.5 % (ref 0–1.5)
BILIRUB SERPL-MCNC: 0.3 MG/DL (ref 0–1.2)
BUN SERPL-MCNC: 26 MG/DL (ref 8–23)
BUN/CREAT SERPL: 40 (ref 7–25)
CALCIUM SPEC-SCNC: 10.3 MG/DL (ref 8.6–10.5)
CHLORIDE SERPL-SCNC: 104 MMOL/L (ref 98–107)
CO2 SERPL-SCNC: 27.7 MMOL/L (ref 22–29)
CREAT SERPL-MCNC: 0.65 MG/DL (ref 0.57–1)
DEPRECATED RDW RBC AUTO: 46.6 FL (ref 37–54)
EGFRCR SERPLBLD CKD-EPI 2021: 90.8 ML/MIN/1.73
EOSINOPHIL # BLD AUTO: 0.51 10*3/MM3 (ref 0–0.4)
EOSINOPHIL NFR BLD AUTO: 3.9 % (ref 0.3–6.2)
ERYTHROCYTE [DISTWIDTH] IN BLOOD BY AUTOMATED COUNT: 14.2 % (ref 12.3–15.4)
GLOBULIN UR ELPH-MCNC: 3.3 GM/DL
GLUCOSE SERPL-MCNC: 89 MG/DL (ref 65–99)
HCT VFR BLD AUTO: 43.9 % (ref 34–46.6)
HGB BLD-MCNC: 14.3 G/DL (ref 12–15.9)
IMM GRANULOCYTES # BLD AUTO: 0.03 10*3/MM3 (ref 0–0.05)
IMM GRANULOCYTES NFR BLD AUTO: 0.2 % (ref 0–0.5)
LYMPHOCYTES # BLD AUTO: 3.98 10*3/MM3 (ref 0.7–3.1)
LYMPHOCYTES NFR BLD AUTO: 30.5 % (ref 19.6–45.3)
MCH RBC QN AUTO: 29.4 PG (ref 26.6–33)
MCHC RBC AUTO-ENTMCNC: 32.6 G/DL (ref 31.5–35.7)
MCV RBC AUTO: 90.3 FL (ref 79–97)
MONOCYTES # BLD AUTO: 0.62 10*3/MM3 (ref 0.1–0.9)
MONOCYTES NFR BLD AUTO: 4.7 % (ref 5–12)
NEUTROPHILS NFR BLD AUTO: 60.2 % (ref 42.7–76)
NEUTROPHILS NFR BLD AUTO: 7.85 10*3/MM3 (ref 1.7–7)
NRBC BLD AUTO-RTO: 0 /100 WBC (ref 0–0.2)
PLATELET # BLD AUTO: 317 10*3/MM3 (ref 140–450)
PMV BLD AUTO: 8.9 FL (ref 6–12)
POTASSIUM SERPL-SCNC: 4.3 MMOL/L (ref 3.5–5.2)
PROT SERPL-MCNC: 7.4 G/DL (ref 6–8.5)
RBC # BLD AUTO: 4.86 10*6/MM3 (ref 3.77–5.28)
SODIUM SERPL-SCNC: 141 MMOL/L (ref 136–145)
WBC NRBC COR # BLD AUTO: 13.06 10*3/MM3 (ref 3.4–10.8)

## 2025-07-23 PROCEDURE — 85025 COMPLETE CBC W/AUTO DIFF WBC: CPT | Performed by: NURSE PRACTITIONER

## 2025-07-23 PROCEDURE — 86147 CARDIOLIPIN ANTIBODY EA IG: CPT | Performed by: NURSE PRACTITIONER

## 2025-07-23 PROCEDURE — 85302 CLOT INHIBIT PROT C ANTIGEN: CPT | Performed by: NURSE PRACTITIONER

## 2025-07-23 PROCEDURE — 85306 CLOT INHIBIT PROT S FREE: CPT | Performed by: NURSE PRACTITIONER

## 2025-07-23 PROCEDURE — 85670 THROMBIN TIME PLASMA: CPT | Performed by: NURSE PRACTITIONER

## 2025-07-23 PROCEDURE — 85305 CLOT INHIBIT PROT S TOTAL: CPT | Performed by: NURSE PRACTITIONER

## 2025-07-23 PROCEDURE — 80053 COMPREHEN METABOLIC PANEL: CPT | Performed by: NURSE PRACTITIONER

## 2025-07-23 PROCEDURE — 85705 THROMBOPLASTIN INHIBITION: CPT | Performed by: NURSE PRACTITIONER

## 2025-07-23 PROCEDURE — G0463 HOSPITAL OUTPT CLINIC VISIT: HCPCS | Performed by: NURSE PRACTITIONER

## 2025-07-23 PROCEDURE — 85303 CLOT INHIBIT PROT C ACTIVITY: CPT | Performed by: NURSE PRACTITIONER

## 2025-07-23 PROCEDURE — 36415 COLL VENOUS BLD VENIPUNCTURE: CPT | Performed by: NURSE PRACTITIONER

## 2025-07-23 PROCEDURE — 85613 RUSSELL VIPER VENOM DILUTED: CPT | Performed by: NURSE PRACTITIONER

## 2025-07-23 PROCEDURE — 86146 BETA-2 GLYCOPROTEIN ANTIBODY: CPT | Performed by: NURSE PRACTITIONER

## 2025-07-23 PROCEDURE — 85732 THROMBOPLASTIN TIME PARTIAL: CPT | Performed by: NURSE PRACTITIONER

## 2025-07-23 PROCEDURE — 85300 ANTITHROMBIN III ACTIVITY: CPT | Performed by: NURSE PRACTITIONER

## 2025-07-23 NOTE — PROGRESS NOTES
Chief Complaint/Reason for Referral:  NEW PT - HEMATOLOGY - History of DVT    Chanel Bunch, AP*  Chanel Bunch APRN    Records Obtained:  Records of the patients history including those obtained from [facility] and patient information were reviewed and summarized in detail.    Subjective      History of Present Illness  The patient is a 79-year-old  female who presents for a new patient evaluation for a history of deep vein thrombosis (DVT). She follows with TOI Lemus as her primary care provider. She has a history of DVT on two different occasions, both considered provoked: one during pregnancy and one due to surgery. Other past medical history includes arthritis, cataracts, and other unspecified medical conditions. She has a family history of cancer in her mother and father, as well as heart disease in both parents. She was recently diagnosed with a renal mass, which prompted this visit to hematology due to concerns about a potential blood clot that was not previously fully investigated.    She experienced her first blood clot in 05/1979 during her third pregnancy, which was managed with heparin injections and Coumadin. The second clot occurred in 2018 or 2019, following a gastric sleeve surgery for weight loss. Both DVT's were felt to be provoked. This was treated with Eliquis for 3 to 6 months. She developed blood clots in both legs 2 to 3 weeks post-surgery. She does not recall any workup being done for the blood clots. She was advised by her surgeon to inform them if she ever required abdominal surgery again. She has an upcoming appointment with Dr. Fraser on 08/14/2025. She is eager to have the surgery as soon as possible. She has not yet informed her children about her condition.    Her blood pressure readings are consistently around 150 when measured at the doctor's office, but she records lower readings of 128 to 130 at home in the morning.    PAST SURGICAL HISTORY:  Gastric  sleeve surgery for weight loss in 2018 or 2019.    SOCIAL HISTORY  She does not smoke currently but admits to smoking when she was young.    FAMILY HISTORY  She reports no family history of blood clots. Her mother had cancer and heart disease. Her father had cancer and heart disease as well.    Results  Imaging   - MRI of the abdomen: 06/27/2025, 3 cm solid enhancing left renal neoplasm suspicious for renal cell carcinoma. 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      Oncology/Hematology History    No history exists.       Review of Systems   Constitutional: Negative.    HENT: Negative.     Eyes: Negative.    Respiratory: Negative.     Cardiovascular: Negative.    Gastrointestinal: Negative.    Endocrine: Negative.    Genitourinary: Negative.    Skin: Negative.    Allergic/Immunologic: Negative.    Neurological: Negative.    Hematological: Negative.    Psychiatric/Behavioral: Negative.         Current Outpatient Medications on File Prior to Visit   Medication Sig Dispense Refill    albuterol sulfate  (90 Base) MCG/ACT inhaler Inhale 2 puffs Every 4 (Four) Hours As Needed for Wheezing or Shortness of Air. 8 g 0    atorvastatin (LIPITOR) 10 MG tablet TAKE 1 TABLET BY MOUTH EVERY DAY 90 tablet 1    calcium acetate (PHOS BINDER,) 667 MG capsule capsule Take 2 capsules by mouth 3 (Three) Times a Day.      Cobalamin Combinations (B-12) 100-5000 MCG sublingual tablet Place  under the tongue. 1 A WEEK      hydroCHLOROthiazide 12.5 MG tablet TAKE 1 TABLET BY MOUTH EVERY DAY 90 tablet 0    iron polysacch complex-B12-FA (FERREX 150 FORTE) 150-0.025-1 MG capsule capsule Take  by mouth Daily.      levothyroxine (SYNTHROID, LEVOTHROID) 25 MCG tablet Take 1 tablet by mouth Every Morning. 90 tablet 1    metFORMIN (GLUCOPHAGE) 500 MG tablet Take 1 tablet by mouth 2 (Two) Times a Day With Meals. 180 tablet 1    multivitamin with minerals tablet tablet Take  "1 tablet by mouth Daily.      Diclofenac Sodium (VOLTAREN) 1 % gel gel Apply 4 g topically to the appropriate area as directed 4 (Four) Times a Day. (Patient not taking: Reported on 7/23/2025) 100 g 1    triamcinolone (KENALOG) 0.1 % ointment Apply  topically to the appropriate area as directed. (Patient not taking: Reported on 7/23/2025)       No current facility-administered medications on file prior to visit.       Allergies   Allergen Reactions    Erythromycin Base Hallucinations     \"Turns me into a zombie\"    Tramadol Rash     Past Medical History:   Diagnosis Date    Arthritis     Cataract     Deep vein thrombosis 1979    History of medical problems      Past Surgical History:   Procedure Laterality Date    BARIATRIC SURGERY  2019    TUBAL ABDOMINAL LIGATION  1980     Social History     Socioeconomic History    Marital status:    Tobacco Use    Smoking status: Never     Passive exposure: Never    Smokeless tobacco: Never   Vaping Use    Vaping status: Never Used   Substance and Sexual Activity    Alcohol use: Not Currently    Drug use: Never    Sexual activity: Defer     Family History   Problem Relation Age of Onset    Cancer Mother     Heart disease Mother     Heart disease Father     Cancer Father      Immunization History   Administered Date(s) Administered    Arexvy (RSV, Adults 60+ yrs) 10/24/2024    COVID-19 (PFIZER) BIVALENT 12+YRS 07/07/2023    COVID-19 (PFIZER) Purple Cap Monovalent 05/01/2021, 05/22/2021, 11/22/2021    Covid-19 (Pfizer) Gray Cap Monovalent 05/24/2022    Fluzone High-Dose 65+YRS 11/11/2015, 09/28/2016, 02/01/2018, 11/14/2018, 10/01/2019    Fluzone High-Dose 65+yrs 09/14/2020, 10/22/2021, 10/21/2022, 09/27/2023, 09/03/2024    Hepatitis A 09/11/2018    Shingrix 09/14/2020, 03/19/2021    Tdap 01/08/2020       Tobacco Use: Low Risk  (7/23/2025)    Patient History     Smoking Tobacco Use: Never     Smokeless Tobacco Use: Never     Passive Exposure: Never       Objective " "    Physical Exam  Vitals and nursing note reviewed.   Constitutional:       Appearance: Normal appearance.   HENT:      Mouth/Throat:      Mouth: Mucous membranes are moist.   Eyes:      Extraocular Movements: Extraocular movements intact.   Cardiovascular:      Rate and Rhythm: Normal rate.   Pulmonary:      Effort: Pulmonary effort is normal. No respiratory distress.   Musculoskeletal:         General: Normal range of motion.      Cervical back: Normal range of motion and neck supple.   Skin:     General: Skin is warm and dry.   Neurological:      General: No focal deficit present.      Mental Status: She is alert and oriented to person, place, and time.   Psychiatric:         Mood and Affect: Mood normal.         Behavior: Behavior normal.         Thought Content: Thought content normal.         Judgment: Judgment normal.         Vitals:    07/23/25 1309   BP: 152/87   Pulse: 76   Resp: 18   Temp: 98.1 °F (36.7 °C)   TempSrc: Oral   SpO2: 96%   Weight: 102 kg (224 lb 13.9 oz)   Height: 157.5 cm (62.01\")   PainSc: 0-No pain       Wt Readings from Last 3 Encounters:   07/23/25 102 kg (224 lb 13.9 oz)   07/09/25 102 kg (225 lb)   07/02/25 102 kg (225 lb)       ECOG score: 0         ECOG: (0) Fully Active - Able to Carry On All Pre-disease Performance Without Restriction  Fall Risk Assessment was completed, and patient is at low risk for falls.  PHQ-9 Total Score:         The patient is  experiencing fatigue. Fatigue score: 2    PT/OT Functional Screening: PT fx screen : No needs identified  Speech Functional Screening: Speech fx screen : No needs identified  Rehab to be ordered: Rehab to be ordered : No needs identified        Result Review :  The following data was reviewed by: TOI Cortez on 07/23/2025:  Lab Results   Component Value Date    HGB 15.0 03/13/2025    HCT 46.9 (H) 03/13/2025    MCV 91.2 03/13/2025     03/13/2025    WBC 8.53 03/13/2025    NEUTROABS 3.79 03/13/2025    LYMPHSABS " "3.88 (H) 03/13/2025    MONOSABS 0.55 03/13/2025    EOSABS 0.25 03/13/2025    BASOSABS 0.05 03/13/2025     Lab Results   Component Value Date    GLUCOSE 102 (H) 03/13/2025    BUN 25 (H) 03/13/2025    CREATININE 0.70 06/27/2025     03/13/2025    K 3.9 03/13/2025     03/13/2025    CO2 25.9 03/13/2025    CALCIUM 9.6 03/13/2025    PROTEINTOT 7.2 03/13/2025    ALBUMIN 4.0 03/13/2025    BILITOT 0.3 03/13/2025    ALKPHOS 60 03/13/2025    AST 24 03/13/2025    ALT 24 03/13/2025     Lab Results   Component Value Date    Ferritin 118.00 10/21/2022    Folate >20.00 10/25/2023     Lab Results   Component Value Date    IRON 104 10/21/2022    LABIRON 27 10/21/2022    TRANSFERRIN 258 10/21/2022    TIBC 384 10/21/2022     Lab Results   Component Value Date    FERRITIN 118.00 10/21/2022    DJARHGFL84 604 10/25/2023    FOLATE >20.00 10/25/2023     No results found for: \"PSA\", \"CEA\", \"AFP\", \"\", \"\"         Assessment and Plan:  Diagnoses and all orders for this visit:    1. Thrombophilia (Primary)  -     Comprehensive Metabolic Panel    2. History of DVT (deep vein thrombosis)  -     Antithrombin III  -     Beta-2 Glycoprotein Antibodies  -     Cardiolipin Antibody  -     Lupus Anticoagulant  -     Protein C Activity  -     Protein C Antigen, Total  -     Cancel: Protein S Antigen, Free  -     Protein S Functional  -     Protein S, Total & Free  -     CBC & Differential  -     Comprehensive Metabolic Panel    3. Personal history of DVT (deep vein thrombosis)    4. Personal history of other venous thrombosis and embolism    5. Thrombophilia during pregnancy in second trimester        Assessment & Plan  1. Renal Mass.  An MRI of the abdomen conducted on 06/27/2025, revealed a 3 cm solid enhancing left renal neoplasm suspicious for renal cell carcinoma. The patient is scheduled to see Dr. Fraser on 08/14/2025, to discuss potential surgery. The importance of informing the surgeon about her history of DVT to ensure " appropriate perioperative management was emphasized.     The treatment plan includes surgical intervention to remove the renal mass. The goals of the treatment are to excise the tumor completely and to obtain clear margins to prevent recurrence. The potential side effects of the surgery, including the risk of bleeding, infection, and the need for postoperative monitoring, were discussed. Supportive care measures, such as pain management and monitoring for any signs of complications, will be provided postoperatively.    2. Deep Vein Thrombosis (DVT).  The patient has a history of DVT on two occasions, one during pregnancy in 1979 and another post-surgery in 2018 or 2019. Both instances were  felt to be provoked. A thrombophilia workup will be conducted to identify any underlying genetic mutations such as factor V Leiden or lupus anticoagulant disorder. A complete blood count (CBC) and chemistry panel will also be ordered. If the thrombophilia workup reveals any abnormalities, lifelong anticoagulation therapy may be considered.   She is not aware of any prior family history with thrombophilia. Insurance did not approve the FVL and the pro-thrombin gene mutation.  Will consider re-ordering the FVL and pro-thrombin gene muation if any of the above thrombophilia work up is abnormal. I would recommend anti-coagulation after the abdominal surgery given the prior history of provoked DVT after surgery after abdominal surgery in the past. Recommend  Lovenox injection for 1 month post surgery. Recommend early ambulation  as well as mechanical SCD's following the surgery during immobility. If the renal mass is positive for malignancy, then may need consideration for lifelong anti-coagulation.     The risks and benefits of lifelong anticoagulation therapy were discussed. The benefits include the prevention of future thrombotic events, while the risks involve potential bleeding complications. Alternatives to lifelong  anticoagulation, such as periodic monitoring and short-term anticoagulation during high-risk periods, were also considered.    3. Elevated Blood Pressure.  The patient reports that her blood pressure readings are around 150 mmHg at the doctor's office but 128-130 mmHg at home. This may indicate white coat syndrome. No changes to her current management were discussed during this visit.        I spent 40 minutes caring for Leida on this date of service. This time includes time spent by me in the following activities:preparing for the visit, reviewing tests, obtaining and/or reviewing a separately obtained history, performing a medically appropriate examination and/or evaluation , counseling and educating the patient/family/caregiver, ordering medications, tests, or procedures, referring and communicating with other health care professionals , documenting information in the medical record, and independently interpreting results and communicating that information with the patient/family/caregiver    Patient Follow Up:  2 weeks with MD follow up.     Patient was given instructions and counseling regarding her condition or for health maintenance advice. Please see specific information pulled into the AVS if appropriate.     TOI Cortez    7/23/2025        Patient or patient representative verbalized consent for the use of Ambient Listening during the visit with  TOI Cortez for chart documentation. 7/23/2025  14:50 EDT

## 2025-07-24 LAB
B2 GLYCOPROT1 IGA SER-ACNC: <9 GPI IGA UNITS (ref 0–25)
B2 GLYCOPROT1 IGG SER-ACNC: <9 GPI IGG UNITS (ref 0–20)
B2 GLYCOPROT1 IGM SER-ACNC: <9 GPI IGM UNITS (ref 0–32)
CARDIOLIPIN IGA SER IA-ACNC: <9 APL U/ML (ref 0–11)
CARDIOLIPIN IGG SER IA-ACNC: <9 GPL U/ML (ref 0–14)
CARDIOLIPIN IGM SER IA-ACNC: <9 MPL U/ML (ref 0–12)
PROT C ACT/NOR PPP CHRO: 132 % (ref 73–180)
PROT S AG ACT/NOR PPP IA: 115 % (ref 60–150)
PROT S FREE AG ACT/NOR PPP IA: 119 % (ref 61–136)

## 2025-07-25 LAB
APTT SCREEN TO CONFIRM RATIO: 1.08 RATIO (ref 0–1.34)
CONFIRM APTT/NORMAL: 41.5 SEC (ref 0–47.6)
LA 2 SCREEN W REFLEX-IMP: NORMAL
PROT C AG ACT/NOR PPP IA: 97 % (ref 60–150)
PROT S ACT/NOR PPP: 105 % (ref 63–140)
SCREEN APTT: 25.7 SEC (ref 0–43.5)
SCREEN DRVVT: 40.3 SEC (ref 0–47)
THROMBIN TIME: 20.8 SEC (ref 0–23)

## 2025-08-03 DIAGNOSIS — I10 HYPERTENSION, UNSPECIFIED TYPE: ICD-10-CM

## 2025-08-03 DIAGNOSIS — R73.03 PREDIABETES: ICD-10-CM

## 2025-08-04 RX ORDER — HYDROCHLOROTHIAZIDE 12.5 MG/1
12.5 TABLET ORAL DAILY
Qty: 90 TABLET | Refills: 1 | Status: SHIPPED | OUTPATIENT
Start: 2025-08-04

## 2025-08-04 RX ORDER — LOSARTAN POTASSIUM 25 MG/1
25 TABLET ORAL DAILY
Qty: 90 TABLET | Refills: 0 | OUTPATIENT
Start: 2025-08-04

## 2025-08-07 ENCOUNTER — OFFICE VISIT (OUTPATIENT)
Dept: ONCOLOGY | Facility: HOSPITAL | Age: 80
End: 2025-08-07
Payer: MEDICARE

## 2025-08-07 VITALS
HEIGHT: 62 IN | TEMPERATURE: 98.8 F | WEIGHT: 226.19 LBS | DIASTOLIC BLOOD PRESSURE: 85 MMHG | HEART RATE: 78 BPM | SYSTOLIC BLOOD PRESSURE: 155 MMHG | RESPIRATION RATE: 18 BRPM | BODY MASS INDEX: 41.62 KG/M2 | OXYGEN SATURATION: 97 %

## 2025-08-07 DIAGNOSIS — Z86.718 HISTORY OF DVT (DEEP VEIN THROMBOSIS): Primary | ICD-10-CM

## 2025-08-07 PROCEDURE — G0463 HOSPITAL OUTPT CLINIC VISIT: HCPCS | Performed by: INTERNAL MEDICINE

## 2025-08-07 RX ORDER — ENOXAPARIN SODIUM 100 MG/ML
40 INJECTION SUBCUTANEOUS
Qty: 12 ML | Refills: 0 | Status: SHIPPED | OUTPATIENT
Start: 2025-08-07 | End: 2025-09-06

## 2025-08-14 ENCOUNTER — OFFICE VISIT (OUTPATIENT)
Dept: UROLOGY | Age: 80
End: 2025-08-14
Payer: MEDICARE

## 2025-08-14 ENCOUNTER — PREP FOR SURGERY (OUTPATIENT)
Dept: OTHER | Facility: HOSPITAL | Age: 80
End: 2025-08-14
Payer: MEDICARE

## 2025-08-14 VITALS — HEIGHT: 62 IN | BODY MASS INDEX: 41.36 KG/M2

## 2025-08-14 DIAGNOSIS — N28.89 RENAL MASS: Primary | ICD-10-CM

## 2025-08-14 DIAGNOSIS — Z86.718 HISTORY OF DVT (DEEP VEIN THROMBOSIS): ICD-10-CM

## 2025-08-14 DIAGNOSIS — N28.89 LEFT RENAL MASS: Primary | ICD-10-CM
